# Patient Record
Sex: MALE | Race: WHITE | NOT HISPANIC OR LATINO | Employment: OTHER | URBAN - METROPOLITAN AREA
[De-identification: names, ages, dates, MRNs, and addresses within clinical notes are randomized per-mention and may not be internally consistent; named-entity substitution may affect disease eponyms.]

---

## 2017-11-08 ENCOUNTER — APPOINTMENT (OUTPATIENT)
Dept: ADMISSIONS | Facility: MEDICAL CENTER | Age: 62
DRG: 470 | End: 2017-11-08
Attending: ORTHOPAEDIC SURGERY
Payer: MEDICARE

## 2017-11-08 DIAGNOSIS — Z01.810 PRE-OPERATIVE CARDIOVASCULAR EXAMINATION: ICD-10-CM

## 2017-11-08 DIAGNOSIS — Z01.812 PRE-OPERATIVE LABORATORY EXAMINATION: ICD-10-CM

## 2017-11-08 LAB
APPEARANCE UR: CLEAR
BILIRUB UR QL STRIP.AUTO: ABNORMAL
COLOR UR: ABNORMAL
CULTURE IF INDICATED INDCX: NO UA CULTURE
EST. AVERAGE GLUCOSE BLD GHB EST-MCNC: 154 MG/DL
GLUCOSE UR STRIP.AUTO-MCNC: NEGATIVE MG/DL
HBA1C MFR BLD: 7 % (ref 0–5.6)
KETONES UR STRIP.AUTO-MCNC: ABNORMAL MG/DL
LEUKOCYTE ESTERASE UR QL STRIP.AUTO: NEGATIVE
MICRO URNS: ABNORMAL
NITRITE UR QL STRIP.AUTO: NEGATIVE
PH UR STRIP.AUTO: 6 [PH]
PROT UR QL STRIP: NEGATIVE MG/DL
RBC UR QL AUTO: NEGATIVE
SCCMEC + MECA PNL NOSE NAA+PROBE: NEGATIVE
SCCMEC + MECA PNL NOSE NAA+PROBE: NEGATIVE
SP GR UR STRIP.AUTO: 1.02
UROBILINOGEN UR STRIP.AUTO-MCNC: 0.2 MG/DL

## 2017-11-08 PROCEDURE — 36415 COLL VENOUS BLD VENIPUNCTURE: CPT

## 2017-11-08 PROCEDURE — 83036 HEMOGLOBIN GLYCOSYLATED A1C: CPT

## 2017-11-08 PROCEDURE — 87641 MR-STAPH DNA AMP PROBE: CPT

## 2017-11-08 PROCEDURE — 87640 STAPH A DNA AMP PROBE: CPT | Mod: 59

## 2017-11-08 PROCEDURE — 81003 URINALYSIS AUTO W/O SCOPE: CPT

## 2017-11-08 RX ORDER — GABAPENTIN 800 MG/1
800 TABLET ORAL 3 TIMES DAILY
COMMUNITY
End: 2023-01-25

## 2017-11-08 RX ORDER — IBUPROFEN 200 MG
200 TABLET ORAL EVERY 6 HOURS PRN
Status: ON HOLD | COMMUNITY
End: 2017-11-16

## 2017-11-08 RX ORDER — BUPRENORPHINE AND NALOXONE 8; 2 MG/1; MG/1
FILM, SOLUBLE BUCCAL; SUBLINGUAL 3 TIMES DAILY
Status: ON HOLD | COMMUNITY
End: 2017-11-16

## 2017-11-08 NOTE — OR NURSING
Pre admit appt: Pt instructed to continue regularly prescribed medications through day before surgery.Per anesthesia protocol pt instructed to take these medications with a sip of water the day of surgery- gabapentin, omeprazole, klonipin and verapamil. States stopping suboxone 3 days before surgery.

## 2017-11-08 NOTE — DISCHARGE PLANNING
DISCHARGE PLANNING NOTE - TOTAL JOINT     Procedure: Procedure(s):  HIP ARTHROPLASTY TOTAL  Procedure Date: 11/14/2017  Insurance:  Payor: MEDICARE / Plan: MEDICARE PART A & B / Product Type: *No Product type* /   Equipment currently available at home? cane and tub transfer bench  Steps into the home? 1  Steps within the home? 0  Toilet height? Standard  Type of shower? tub-shower  Who will be with you during your recovery? spouse  Is Outpatient Physical Therapy set up after surgery? No   Did you take the Total Joint Class and where? No     Plan:

## 2017-11-14 ENCOUNTER — HOSPITAL ENCOUNTER (INPATIENT)
Facility: MEDICAL CENTER | Age: 62
LOS: 3 days | DRG: 470 | End: 2017-11-17
Attending: ORTHOPAEDIC SURGERY | Admitting: ORTHOPAEDIC SURGERY
Payer: MEDICARE

## 2017-11-14 ENCOUNTER — APPOINTMENT (OUTPATIENT)
Dept: RADIOLOGY | Facility: MEDICAL CENTER | Age: 62
DRG: 470 | End: 2017-11-14
Attending: ORTHOPAEDIC SURGERY
Payer: MEDICARE

## 2017-11-14 DIAGNOSIS — M16.11 PRIMARY OSTEOARTHRITIS OF RIGHT HIP: ICD-10-CM

## 2017-11-14 DIAGNOSIS — Z96.641 STATUS POST TOTAL REPLACEMENT OF RIGHT HIP: Primary | ICD-10-CM

## 2017-11-14 LAB
GLUCOSE BLD-MCNC: 124 MG/DL (ref 65–99)
GLUCOSE BLD-MCNC: 202 MG/DL (ref 65–99)
GLUCOSE BLD-MCNC: 276 MG/DL (ref 65–99)

## 2017-11-14 PROCEDURE — 82962 GLUCOSE BLOOD TEST: CPT

## 2017-11-14 PROCEDURE — 160048 HCHG OR STATISTICAL LEVEL 1-5: Performed by: ORTHOPAEDIC SURGERY

## 2017-11-14 PROCEDURE — 160009 HCHG ANES TIME/MIN: Performed by: ORTHOPAEDIC SURGERY

## 2017-11-14 PROCEDURE — 160002 HCHG RECOVERY MINUTES (STAT): Performed by: ORTHOPAEDIC SURGERY

## 2017-11-14 PROCEDURE — 700112 HCHG RX REV CODE 229: Performed by: ORTHOPAEDIC SURGERY

## 2017-11-14 PROCEDURE — 501480 HCHG STOCKINETTE: Performed by: ORTHOPAEDIC SURGERY

## 2017-11-14 PROCEDURE — 72170 X-RAY EXAM OF PELVIS: CPT

## 2017-11-14 PROCEDURE — 160042 HCHG SURGERY MINUTES - EA ADDL 1 MIN LEVEL 5: Performed by: ORTHOPAEDIC SURGERY

## 2017-11-14 PROCEDURE — A9270 NON-COVERED ITEM OR SERVICE: HCPCS | Performed by: ORTHOPAEDIC SURGERY

## 2017-11-14 PROCEDURE — 700101 HCHG RX REV CODE 250

## 2017-11-14 PROCEDURE — 160031 HCHG SURGERY MINUTES - 1ST 30 MINS LEVEL 5: Performed by: ORTHOPAEDIC SURGERY

## 2017-11-14 PROCEDURE — 770006 HCHG ROOM/CARE - MED/SURG/GYN SEMI*

## 2017-11-14 PROCEDURE — 160035 HCHG PACU - 1ST 60 MINS PHASE I: Performed by: ORTHOPAEDIC SURGERY

## 2017-11-14 PROCEDURE — 700102 HCHG RX REV CODE 250 W/ 637 OVERRIDE(OP)

## 2017-11-14 PROCEDURE — A9270 NON-COVERED ITEM OR SERVICE: HCPCS

## 2017-11-14 PROCEDURE — 700102 HCHG RX REV CODE 250 W/ 637 OVERRIDE(OP): Performed by: ORTHOPAEDIC SURGERY

## 2017-11-14 PROCEDURE — 501838 HCHG SUTURE GENERAL: Performed by: ORTHOPAEDIC SURGERY

## 2017-11-14 PROCEDURE — 160036 HCHG PACU - EA ADDL 30 MINS PHASE I: Performed by: ORTHOPAEDIC SURGERY

## 2017-11-14 PROCEDURE — 502000 HCHG MISC OR IMPLANTS RC 0278: Performed by: ORTHOPAEDIC SURGERY

## 2017-11-14 PROCEDURE — 700111 HCHG RX REV CODE 636 W/ 250 OVERRIDE (IP): Performed by: ORTHOPAEDIC SURGERY

## 2017-11-14 PROCEDURE — 0SR904A REPLACEMENT OF RIGHT HIP JOINT WITH CERAMIC ON POLYETHYLENE SYNTHETIC SUBSTITUTE, UNCEMENTED, OPEN APPROACH: ICD-10-PCS | Performed by: ORTHOPAEDIC SURGERY

## 2017-11-14 PROCEDURE — 502578 HCHG PACK, TOTAL HIP: Performed by: ORTHOPAEDIC SURGERY

## 2017-11-14 PROCEDURE — 94760 N-INVAS EAR/PLS OXIMETRY 1: CPT

## 2017-11-14 PROCEDURE — 700111 HCHG RX REV CODE 636 W/ 250 OVERRIDE (IP)

## 2017-11-14 PROCEDURE — 500002 HCHG ADHESIVE, DERMABOND: Performed by: ORTHOPAEDIC SURGERY

## 2017-11-14 DEVICE — IMPLANTABLE DEVICE: Type: IMPLANTABLE DEVICE | Site: HIP | Status: FUNCTIONAL

## 2017-11-14 RX ORDER — DEXAMETHASONE SODIUM PHOSPHATE 4 MG/ML
4 INJECTION, SOLUTION INTRA-ARTICULAR; INTRALESIONAL; INTRAMUSCULAR; INTRAVENOUS; SOFT TISSUE
Status: DISCONTINUED | OUTPATIENT
Start: 2017-11-14 | End: 2017-11-17 | Stop reason: HOSPADM

## 2017-11-14 RX ORDER — ACETAMINOPHEN 500 MG
TABLET ORAL
Status: COMPLETED
Start: 2017-11-14 | End: 2017-11-14

## 2017-11-14 RX ORDER — POLYETHYLENE GLYCOL 3350 17 G/17G
1 POWDER, FOR SOLUTION ORAL 2 TIMES DAILY PRN
Status: DISCONTINUED | OUTPATIENT
Start: 2017-11-14 | End: 2017-11-17 | Stop reason: HOSPADM

## 2017-11-14 RX ORDER — GABAPENTIN 300 MG/1
CAPSULE ORAL
Status: COMPLETED
Start: 2017-11-14 | End: 2017-11-14

## 2017-11-14 RX ORDER — BISACODYL 10 MG
10 SUPPOSITORY, RECTAL RECTAL
Status: DISCONTINUED | OUTPATIENT
Start: 2017-11-14 | End: 2017-11-17 | Stop reason: HOSPADM

## 2017-11-14 RX ORDER — AMOXICILLIN 250 MG
1 CAPSULE ORAL NIGHTLY
Status: DISCONTINUED | OUTPATIENT
Start: 2017-11-14 | End: 2017-11-17 | Stop reason: HOSPADM

## 2017-11-14 RX ORDER — CELECOXIB 200 MG/1
200 CAPSULE ORAL 2 TIMES DAILY WITH MEALS
Status: DISCONTINUED | OUTPATIENT
Start: 2017-11-16 | End: 2017-11-17 | Stop reason: HOSPADM

## 2017-11-14 RX ORDER — ONDANSETRON 2 MG/ML
4 INJECTION INTRAMUSCULAR; INTRAVENOUS EVERY 4 HOURS PRN
Status: DISCONTINUED | OUTPATIENT
Start: 2017-11-14 | End: 2017-11-17 | Stop reason: HOSPADM

## 2017-11-14 RX ORDER — DIPHENHYDRAMINE HYDROCHLORIDE 50 MG/ML
25 INJECTION INTRAMUSCULAR; INTRAVENOUS EVERY 6 HOURS PRN
Status: DISCONTINUED | OUTPATIENT
Start: 2017-11-14 | End: 2017-11-17 | Stop reason: HOSPADM

## 2017-11-14 RX ORDER — OXYCODONE HYDROCHLORIDE 10 MG/1
20 TABLET ORAL
Status: DISCONTINUED | OUTPATIENT
Start: 2017-11-14 | End: 2017-11-17 | Stop reason: HOSPADM

## 2017-11-14 RX ORDER — CLONAZEPAM 0.5 MG/1
1 TABLET ORAL 3 TIMES DAILY
Status: DISCONTINUED | OUTPATIENT
Start: 2017-11-14 | End: 2017-11-17 | Stop reason: HOSPADM

## 2017-11-14 RX ORDER — OXYCODONE HCL 10 MG/1
TABLET, FILM COATED, EXTENDED RELEASE ORAL
Status: COMPLETED
Start: 2017-11-14 | End: 2017-11-14

## 2017-11-14 RX ORDER — MEPERIDINE HYDROCHLORIDE 25 MG/ML
INJECTION INTRAMUSCULAR; INTRAVENOUS; SUBCUTANEOUS
Status: COMPLETED
Start: 2017-11-14 | End: 2017-11-14

## 2017-11-14 RX ORDER — VERAPAMIL HYDROCHLORIDE 240 MG/1
240 TABLET, FILM COATED, EXTENDED RELEASE ORAL
Status: DISCONTINUED | OUTPATIENT
Start: 2017-11-15 | End: 2017-11-17 | Stop reason: HOSPADM

## 2017-11-14 RX ORDER — GABAPENTIN 400 MG/1
800 CAPSULE ORAL 3 TIMES DAILY
Status: DISCONTINUED | OUTPATIENT
Start: 2017-11-14 | End: 2017-11-17 | Stop reason: HOSPADM

## 2017-11-14 RX ORDER — AMOXICILLIN 250 MG
1 CAPSULE ORAL
Status: DISCONTINUED | OUTPATIENT
Start: 2017-11-14 | End: 2017-11-17 | Stop reason: HOSPADM

## 2017-11-14 RX ORDER — OMEPRAZOLE 20 MG/1
40 CAPSULE, DELAYED RELEASE ORAL
Status: DISCONTINUED | OUTPATIENT
Start: 2017-11-14 | End: 2017-11-17 | Stop reason: HOSPADM

## 2017-11-14 RX ORDER — HYDROCHLOROTHIAZIDE 25 MG/1
12.5 TABLET ORAL DAILY
Status: DISCONTINUED | OUTPATIENT
Start: 2017-11-15 | End: 2017-11-17 | Stop reason: HOSPADM

## 2017-11-14 RX ORDER — BUPIVACAINE HYDROCHLORIDE AND EPINEPHRINE 2.5; 5 MG/ML; UG/ML
INJECTION, SOLUTION EPIDURAL; INFILTRATION; INTRACAUDAL; PERINEURAL
Status: DISCONTINUED | OUTPATIENT
Start: 2017-11-14 | End: 2017-11-14 | Stop reason: HOSPADM

## 2017-11-14 RX ORDER — ENEMA 19; 7 G/133ML; G/133ML
1 ENEMA RECTAL
Status: DISCONTINUED | OUTPATIENT
Start: 2017-11-14 | End: 2017-11-17 | Stop reason: HOSPADM

## 2017-11-14 RX ORDER — SODIUM CHLORIDE, SODIUM LACTATE, POTASSIUM CHLORIDE, CALCIUM CHLORIDE 600; 310; 30; 20 MG/100ML; MG/100ML; MG/100ML; MG/100ML
INJECTION, SOLUTION INTRAVENOUS
Status: DISCONTINUED | OUTPATIENT
Start: 2017-11-14 | End: 2017-11-16

## 2017-11-14 RX ORDER — OXYCODONE HYDROCHLORIDE 10 MG/1
10 TABLET ORAL
Status: DISCONTINUED | OUTPATIENT
Start: 2017-11-14 | End: 2017-11-17 | Stop reason: HOSPADM

## 2017-11-14 RX ORDER — DOCUSATE SODIUM 100 MG/1
100 CAPSULE, LIQUID FILLED ORAL 2 TIMES DAILY
Status: DISCONTINUED | OUTPATIENT
Start: 2017-11-14 | End: 2017-11-17 | Stop reason: HOSPADM

## 2017-11-14 RX ORDER — DULOXETIN HYDROCHLORIDE 30 MG/1
90 CAPSULE, DELAYED RELEASE ORAL DAILY
Status: DISCONTINUED | OUTPATIENT
Start: 2017-11-15 | End: 2017-11-17 | Stop reason: HOSPADM

## 2017-11-14 RX ORDER — NIACIN 100 MG
100 TABLET ORAL DAILY
Status: DISCONTINUED | OUTPATIENT
Start: 2017-11-15 | End: 2017-11-17 | Stop reason: HOSPADM

## 2017-11-14 RX ORDER — ACETAMINOPHEN 500 MG
1000 TABLET ORAL EVERY 6 HOURS
Status: DISCONTINUED | OUTPATIENT
Start: 2017-11-15 | End: 2017-11-17 | Stop reason: HOSPADM

## 2017-11-14 RX ORDER — KETOROLAC TROMETHAMINE 30 MG/ML
30 INJECTION, SOLUTION INTRAMUSCULAR; INTRAVENOUS EVERY 6 HOURS
Status: DISPENSED | OUTPATIENT
Start: 2017-11-15 | End: 2017-11-15

## 2017-11-14 RX ORDER — CELECOXIB 200 MG/1
CAPSULE ORAL
Status: COMPLETED
Start: 2017-11-14 | End: 2017-11-14

## 2017-11-14 RX ORDER — LIDOCAINE HYDROCHLORIDE 10 MG/ML
INJECTION, SOLUTION INFILTRATION; PERINEURAL
Status: COMPLETED
Start: 2017-11-14 | End: 2017-11-14

## 2017-11-14 RX ORDER — OXYCODONE HCL 5 MG/5 ML
SOLUTION, ORAL ORAL
Status: COMPLETED
Start: 2017-11-14 | End: 2017-11-14

## 2017-11-14 RX ADMIN — OXYCODONE HYDROCHLORIDE 10 MG: 10 TABLET, FILM COATED, EXTENDED RELEASE ORAL at 16:45

## 2017-11-14 RX ADMIN — HYDROMORPHONE HYDROCHLORIDE 0.5 MG: 1 INJECTION, SOLUTION INTRAMUSCULAR; INTRAVENOUS; SUBCUTANEOUS at 20:14

## 2017-11-14 RX ADMIN — OXYCODONE HYDROCHLORIDE 10 MG: 5 SOLUTION ORAL at 20:00

## 2017-11-14 RX ADMIN — GABAPENTIN 300 MG: 300 CAPSULE ORAL at 16:45

## 2017-11-14 RX ADMIN — MEPERIDINE HYDROCHLORIDE 12.5 MG: 25 INJECTION INTRAMUSCULAR; INTRAVENOUS; SUBCUTANEOUS at 20:00

## 2017-11-14 RX ADMIN — FENTANYL CITRATE 50 MCG: 50 INJECTION, SOLUTION INTRAMUSCULAR; INTRAVENOUS at 19:41

## 2017-11-14 RX ADMIN — CLONAZEPAM 1 MG: 0.5 TABLET ORAL at 21:51

## 2017-11-14 RX ADMIN — CELECOXIB 400 MG: 200 CAPSULE ORAL at 16:45

## 2017-11-14 RX ADMIN — DOCUSATE SODIUM AND SENNOSIDES 1 TABLET: 8.6; 5 TABLET, FILM COATED ORAL at 21:52

## 2017-11-14 RX ADMIN — SODIUM CHLORIDE, SODIUM LACTATE, POTASSIUM CHLORIDE, CALCIUM CHLORIDE: 600; 310; 30; 20 INJECTION, SOLUTION INTRAVENOUS at 14:55

## 2017-11-14 RX ADMIN — OXYCODONE HYDROCHLORIDE 20 MG: 10 TABLET ORAL at 21:52

## 2017-11-14 RX ADMIN — ACETAMINOPHEN 1000 MG: 500 TABLET ORAL at 23:51

## 2017-11-14 RX ADMIN — HYDROMORPHONE HYDROCHLORIDE 0.5 MG: 1 INJECTION, SOLUTION INTRAMUSCULAR; INTRAVENOUS; SUBCUTANEOUS at 20:25

## 2017-11-14 RX ADMIN — ACETAMINOPHEN 1000 MG: 500 TABLET, COATED ORAL at 16:45

## 2017-11-14 RX ADMIN — KETOROLAC TROMETHAMINE 30 MG: 30 INJECTION, SOLUTION INTRAMUSCULAR at 23:51

## 2017-11-14 RX ADMIN — SITAGLIPTIN 50 MG: 50 TABLET, FILM COATED ORAL at 22:15

## 2017-11-14 RX ADMIN — FENTANYL CITRATE 50 MCG: 50 INJECTION, SOLUTION INTRAMUSCULAR; INTRAVENOUS at 19:47

## 2017-11-14 RX ADMIN — MEPERIDINE HYDROCHLORIDE 12.5 MG: 25 INJECTION INTRAMUSCULAR; INTRAVENOUS; SUBCUTANEOUS at 20:10

## 2017-11-14 RX ADMIN — FENTANYL CITRATE 50 MCG: 50 INJECTION, SOLUTION INTRAMUSCULAR; INTRAVENOUS at 19:36

## 2017-11-14 RX ADMIN — DOCUSATE SODIUM 100 MG: 100 CAPSULE, LIQUID FILLED ORAL at 21:53

## 2017-11-14 RX ADMIN — GABAPENTIN 800 MG: 400 CAPSULE ORAL at 21:52

## 2017-11-14 ASSESSMENT — LIFESTYLE VARIABLES
ON A TYPICAL DAY WHEN YOU DRINK ALCOHOL HOW MANY DRINKS DO YOU HAVE: 1
EVER_SMOKED: YES
ALCOHOL_USE: YES
TOTAL SCORE: 0
CONSUMPTION TOTAL: NEGATIVE
EVER FELT BAD OR GUILTY ABOUT YOUR DRINKING: NO
HAVE PEOPLE ANNOYED YOU BY CRITICIZING YOUR DRINKING: NO
EVER HAD A DRINK FIRST THING IN THE MORNING TO STEADY YOUR NERVES TO GET RID OF A HANGOVER: NO
AVERAGE NUMBER OF DAYS PER WEEK YOU HAVE A DRINK CONTAINING ALCOHOL: 0
EVER_SMOKED: YES
TOTAL SCORE: 0
HOW MANY TIMES IN THE PAST YEAR HAVE YOU HAD 5 OR MORE DRINKS IN A DAY: 0
TOTAL SCORE: 0
HAVE YOU EVER FELT YOU SHOULD CUT DOWN ON YOUR DRINKING: NO

## 2017-11-14 ASSESSMENT — PAIN SCALES - GENERAL
PAINLEVEL_OUTOF10: 8
PAINLEVEL_OUTOF10: ASSUMED PAIN PRESENT
PAINLEVEL_OUTOF10: 8
PAINLEVEL_OUTOF10: ASSUMED PAIN PRESENT
PAINLEVEL_OUTOF10: 8
PAINLEVEL_OUTOF10: 0
PAINLEVEL_OUTOF10: ASSUMED PAIN PRESENT

## 2017-11-14 NOTE — OR NURSING
Pt to pre-op. Name, birthday, allergies, NPO status, last void, medication rec, surgical site and procedure verified with patient.  Pt belongings collected and secured in locker.  Pt verbalizes understanding of pain scale, expected plan of care and course of stay.  IV established. Sequentials placed to lle. Family at bedside.

## 2017-11-15 PROBLEM — Z96.649 S/P TOTAL HIP ARTHROPLASTY: Status: ACTIVE | Noted: 2017-11-15

## 2017-11-15 LAB
ANION GAP SERPL CALC-SCNC: 8 MMOL/L (ref 0–11.9)
BUN SERPL-MCNC: 16 MG/DL (ref 8–22)
CALCIUM SERPL-MCNC: 8.8 MG/DL (ref 8.4–10.2)
CHLORIDE SERPL-SCNC: 105 MMOL/L (ref 96–112)
CO2 SERPL-SCNC: 25 MMOL/L (ref 20–33)
CREAT SERPL-MCNC: 0.7 MG/DL (ref 0.5–1.4)
ERYTHROCYTE [DISTWIDTH] IN BLOOD BY AUTOMATED COUNT: 48.4 FL (ref 35.9–50)
GFR SERPL CREATININE-BSD FRML MDRD: >60 ML/MIN/1.73 M 2
GLUCOSE BLD-MCNC: 152 MG/DL (ref 65–99)
GLUCOSE BLD-MCNC: 209 MG/DL (ref 65–99)
GLUCOSE BLD-MCNC: 212 MG/DL (ref 65–99)
GLUCOSE BLD-MCNC: 253 MG/DL (ref 65–99)
GLUCOSE SERPL-MCNC: 280 MG/DL (ref 65–99)
HCT VFR BLD AUTO: 32.1 % (ref 42–52)
HGB BLD-MCNC: 10.9 G/DL (ref 14–18)
MCH RBC QN AUTO: 29.6 PG (ref 27–33)
MCHC RBC AUTO-ENTMCNC: 34 G/DL (ref 33.7–35.3)
MCV RBC AUTO: 87.2 FL (ref 81.4–97.8)
PLATELET # BLD AUTO: 246 K/UL (ref 164–446)
PMV BLD AUTO: 9.4 FL (ref 9–12.9)
POTASSIUM SERPL-SCNC: 4.1 MMOL/L (ref 3.6–5.5)
RBC # BLD AUTO: 3.68 M/UL (ref 4.7–6.1)
SODIUM SERPL-SCNC: 138 MMOL/L (ref 135–145)
WBC # BLD AUTO: 10.5 K/UL (ref 4.8–10.8)

## 2017-11-15 PROCEDURE — 80048 BASIC METABOLIC PNL TOTAL CA: CPT

## 2017-11-15 PROCEDURE — 97161 PT EVAL LOW COMPLEX 20 MIN: CPT

## 2017-11-15 PROCEDURE — A9270 NON-COVERED ITEM OR SERVICE: HCPCS | Performed by: ORTHOPAEDIC SURGERY

## 2017-11-15 PROCEDURE — 700112 HCHG RX REV CODE 229: Performed by: ORTHOPAEDIC SURGERY

## 2017-11-15 PROCEDURE — G8978 MOBILITY CURRENT STATUS: HCPCS | Mod: CK

## 2017-11-15 PROCEDURE — 700105 HCHG RX REV CODE 258: Performed by: ORTHOPAEDIC SURGERY

## 2017-11-15 PROCEDURE — G8988 SELF CARE GOAL STATUS: HCPCS | Mod: CI

## 2017-11-15 PROCEDURE — 82962 GLUCOSE BLOOD TEST: CPT | Mod: 91

## 2017-11-15 PROCEDURE — 36415 COLL VENOUS BLD VENIPUNCTURE: CPT

## 2017-11-15 PROCEDURE — G8987 SELF CARE CURRENT STATUS: HCPCS | Mod: CJ

## 2017-11-15 PROCEDURE — 700102 HCHG RX REV CODE 250 W/ 637 OVERRIDE(OP): Performed by: ORTHOPAEDIC SURGERY

## 2017-11-15 PROCEDURE — 97110 THERAPEUTIC EXERCISES: CPT

## 2017-11-15 PROCEDURE — 97535 SELF CARE MNGMENT TRAINING: CPT

## 2017-11-15 PROCEDURE — 97165 OT EVAL LOW COMPLEX 30 MIN: CPT

## 2017-11-15 PROCEDURE — 85027 COMPLETE CBC AUTOMATED: CPT

## 2017-11-15 PROCEDURE — 700111 HCHG RX REV CODE 636 W/ 250 OVERRIDE (IP): Performed by: ORTHOPAEDIC SURGERY

## 2017-11-15 PROCEDURE — 770006 HCHG ROOM/CARE - MED/SURG/GYN SEMI*

## 2017-11-15 PROCEDURE — G8979 MOBILITY GOAL STATUS: HCPCS | Mod: CJ

## 2017-11-15 RX ORDER — DEXTROSE MONOHYDRATE 25 G/50ML
25 INJECTION, SOLUTION INTRAVENOUS
Status: DISCONTINUED | OUTPATIENT
Start: 2017-11-15 | End: 2017-11-17 | Stop reason: HOSPADM

## 2017-11-15 RX ADMIN — ACETAMINOPHEN 1000 MG: 500 TABLET ORAL at 05:10

## 2017-11-15 RX ADMIN — POLYETHYLENE GLYCOL 3350 1 PACKET: 17 POWDER, FOR SOLUTION ORAL at 08:44

## 2017-11-15 RX ADMIN — VITAMIN D, TAB 1000IU (100/BT) 1000 UNITS: 25 TAB at 08:46

## 2017-11-15 RX ADMIN — INSULIN HUMAN 2 UNITS: 100 INJECTION, SOLUTION PARENTERAL at 12:29

## 2017-11-15 RX ADMIN — METFORMIN HYDROCHLORIDE 1000 MG: 500 TABLET, FILM COATED ORAL at 08:44

## 2017-11-15 RX ADMIN — CLONAZEPAM 1 MG: 0.5 TABLET ORAL at 21:14

## 2017-11-15 RX ADMIN — GABAPENTIN 800 MG: 400 CAPSULE ORAL at 14:41

## 2017-11-15 RX ADMIN — DOCUSATE SODIUM 100 MG: 100 CAPSULE, LIQUID FILLED ORAL at 08:45

## 2017-11-15 RX ADMIN — ACETAMINOPHEN 1000 MG: 500 TABLET ORAL at 12:28

## 2017-11-15 RX ADMIN — POLYETHYLENE GLYCOL 3350 1 PACKET: 17 POWDER, FOR SOLUTION ORAL at 20:03

## 2017-11-15 RX ADMIN — OXYCODONE HYDROCHLORIDE 10 MG: 10 TABLET ORAL at 07:44

## 2017-11-15 RX ADMIN — DEXTROSE MONOHYDRATE 2 G: 50 INJECTION, SOLUTION INTRAVENOUS at 01:40

## 2017-11-15 RX ADMIN — ENOXAPARIN SODIUM 40 MG: 100 INJECTION SUBCUTANEOUS at 10:05

## 2017-11-15 RX ADMIN — GABAPENTIN 800 MG: 400 CAPSULE ORAL at 20:18

## 2017-11-15 RX ADMIN — DULOXETINE HYDROCHLORIDE 90 MG: 30 CAPSULE, DELAYED RELEASE ORAL at 08:51

## 2017-11-15 RX ADMIN — OXYCODONE HYDROCHLORIDE 10 MG: 10 TABLET ORAL at 11:16

## 2017-11-15 RX ADMIN — SITAGLIPTIN 50 MG: 50 TABLET, FILM COATED ORAL at 08:49

## 2017-11-15 RX ADMIN — CLONAZEPAM 1 MG: 0.5 TABLET ORAL at 08:45

## 2017-11-15 RX ADMIN — Medication 100 MG: at 08:46

## 2017-11-15 RX ADMIN — HYDROMORPHONE HYDROCHLORIDE 1 MG: 1 INJECTION, SOLUTION INTRAMUSCULAR; INTRAVENOUS; SUBCUTANEOUS at 18:39

## 2017-11-15 RX ADMIN — OXYCODONE HYDROCHLORIDE 20 MG: 10 TABLET ORAL at 14:03

## 2017-11-15 RX ADMIN — DOCUSATE SODIUM AND SENNOSIDES 1 TABLET: 8.6; 5 TABLET, FILM COATED ORAL at 20:03

## 2017-11-15 RX ADMIN — OXYCODONE HYDROCHLORIDE 20 MG: 10 TABLET ORAL at 16:59

## 2017-11-15 RX ADMIN — GABAPENTIN 800 MG: 400 CAPSULE ORAL at 08:46

## 2017-11-15 RX ADMIN — HYDROMORPHONE HYDROCHLORIDE 1 MG: 1 INJECTION, SOLUTION INTRAMUSCULAR; INTRAVENOUS; SUBCUTANEOUS at 14:44

## 2017-11-15 RX ADMIN — CLONAZEPAM 1 MG: 0.5 TABLET ORAL at 13:17

## 2017-11-15 RX ADMIN — KETOROLAC TROMETHAMINE 30 MG: 30 INJECTION, SOLUTION INTRAMUSCULAR at 05:11

## 2017-11-15 RX ADMIN — METFORMIN HYDROCHLORIDE 1000 MG: 500 TABLET, FILM COATED ORAL at 17:00

## 2017-11-15 RX ADMIN — OXYCODONE HYDROCHLORIDE 10 MG: 10 TABLET ORAL at 04:24

## 2017-11-15 RX ADMIN — HYDROMORPHONE HYDROCHLORIDE 1 MG: 1 INJECTION, SOLUTION INTRAMUSCULAR; INTRAVENOUS; SUBCUTANEOUS at 22:05

## 2017-11-15 RX ADMIN — OXYCODONE HYDROCHLORIDE 20 MG: 10 TABLET ORAL at 20:22

## 2017-11-15 RX ADMIN — DEXTROSE MONOHYDRATE 2 G: 50 INJECTION, SOLUTION INTRAVENOUS at 10:05

## 2017-11-15 RX ADMIN — DOCUSATE SODIUM 100 MG: 100 CAPSULE, LIQUID FILLED ORAL at 20:03

## 2017-11-15 RX ADMIN — OXYCODONE HYDROCHLORIDE 20 MG: 10 TABLET ORAL at 00:52

## 2017-11-15 RX ADMIN — SITAGLIPTIN 50 MG: 50 TABLET, FILM COATED ORAL at 20:17

## 2017-11-15 ASSESSMENT — GAIT ASSESSMENTS
DEVIATION: DECREASED HEEL STRIKE;DECREASED TOE OFF;STEP TO;ANTALGIC
ASSISTIVE DEVICE: FRONT WHEEL WALKER
GAIT LEVEL OF ASSIST: CONTACT GUARD ASSIST
DISTANCE (FEET): 80

## 2017-11-15 ASSESSMENT — PAIN SCALES - GENERAL
PAINLEVEL_OUTOF10: 6
PAINLEVEL_OUTOF10: ASSUMED PAIN PRESENT
PAINLEVEL_OUTOF10: 8
PAINLEVEL_OUTOF10: 7
PAINLEVEL_OUTOF10: 5
PAINLEVEL_OUTOF10: 8
PAINLEVEL_OUTOF10: 8
PAINLEVEL_OUTOF10: 7
PAINLEVEL_OUTOF10: 6
PAINLEVEL_OUTOF10: 5
PAINLEVEL_OUTOF10: 7
PAINLEVEL_OUTOF10: 6
PAINLEVEL_OUTOF10: 0

## 2017-11-15 ASSESSMENT — ACTIVITIES OF DAILY LIVING (ADL): TOILETING: INDEPENDENT

## 2017-11-15 NOTE — CARE PLAN
Problem: Pain Management  Goal: Pain level will decrease to patient's comfort goal  Outcome: PROGRESSING AS EXPECTED  Pt was initially c/o severe pain after surgery; medicated with 10-20 mg Oxycodone, Tylenol, and Toradol per MAR and polar ice in place with improvement in comfort status. Pt is currently calm, non-labored respirations.      Problem: Respiratory:  Goal: Respiratory status will improve    Intervention: Assess and monitor pulmonary status  Pt on 4L via oxymask upon initial arrival to unit. Encouraging frequent use of incentive spirometer (pt reaching volume of 4000 mL) and weaning O2 as tolerated with continuous pulse ox. Pt currently saturating at 90% on room air.

## 2017-11-15 NOTE — THERAPY
"Physical Therapy Evaluation completed.   Bed Mobility:  Supine to Sit:  (NT, pt sitting up in chair)  Transfers: Sit to Stand: Contact Guard Assist  Gait: Level Of Assist: Contact Guard Assist with Front-Wheel Walker       Plan of Care: Will benefit from Physical Therapy 7 times per week  Discharge Recommendations: Equipment: Raised Toilet Seat. Post-acute therapy Discharge to a transitional care facility for continued skilled therapy services.    See \"Rehab Therapy-Acute\" Patient Summary Report for complete documentation.     "

## 2017-11-15 NOTE — PROGRESS NOTES
2 RN skin assessment done; skin WDL aside from surgical incision to R hip that is clean, dry, and in tact.

## 2017-11-15 NOTE — DISCHARGE PLANNING
Care Transition Team Assessment      SW met with this patient to discuss discharge planning.  Patient stated that he would like to go back to [place] as he has been there before and it is close to his home.  This patient stated that he lives with his wife however worries that she would not be physically able to assist him at this time.  SS to follow up as needed.     Information Source  Orientation : Oriented x 4  Information Given By: Patient  Informant's Name: Michael  Who is responsible for making decisions for patient? : Patient    Readmission Evaluation  Is this a readmission?: No    Elopement Risk  Legal Hold: No  Ambulatory or Self Mobile in Wheelchair: Yes  Disoriented: No  Psychiatric Symptoms: None  History of Wandering: No  Elopement this Admit: No  Vocalizing Wanting to Leave: No  Displays Behaviors, Body Language Wanting to Leave: No-Not at Risk for Elopement  Elopement Risk: Not at Risk for Elopement    Interdisciplinary Discharge Planning  Primary Care Physician: [place]  Lives with - Patient's Self Care Capacity: Spouse  Patient or legal guardian wants to designate a caregiver (see row info): No  Support Systems: Friends / Neighbors, Family Member(s)  Housing / Facility: [address]  Do You Take your Prescribed Medications Regularly: Yes  Able to Return to Previous ADL's: Yes  Mobility Issues: Yes  Prior Services: None  Patient Expects to be Discharged to:: home  Assistance Needed: Unknown at this Time  Durable Medical Equipment: Walker    Discharge Preparedness  What is your plan after discharge?: Skilled nursing facility (Patient is requesting to go to [place] in [place])  What are your discharge supports?: Spouse  Prior Functional Level: Independent with Activities of Daily Living    Functional Assesment  Prior Functional Level: Independent with Activities of Daily Living    Finances  Financial Barriers to Discharge: No  Prescription Coverage: Yes    Vision / Hearing Impairment  Vision  Impairment : No  Hearing Impairment : No    Values / Beliefs / Concerns  Spiritual Requests During Hospitalization: No  Special Hospitalization Concerns: No    Advance Directive  Advance Directive?: None    Domestic Abuse  Have you ever been the victim of abuse or violence?: No  Physical Abuse or Sexual Abuse: No  Verbal Abuse or Emotional Abuse: No    Psychological Assessment  History of Substance Abuse: Prescription opioids  Date Last Used - Prescription Opioids: was in rehab when he fell         Anticipated Discharge Information  Anticipated discharge disposition: SNF

## 2017-11-15 NOTE — PROGRESS NOTES
"Pt assessed. No deformity noted to Right leg. Able to bear weight with FWW. Good CMS and 2+ pulse to right foot. Pt upset saying his pain is not managed. Wants Oxycontin ordered. Says \"I want to go home so I can sleep.\" Told him that a sleeping aid could possibly be ordered. Will call office and discuss.   "

## 2017-11-15 NOTE — OR NURSING
1932: Report from ANNETTE Vaughn.  Patient is laying in bed, chewing on blanket, moaning, she gave him 50 mcg fentanyl.  Dressing to right hip clean, dry, and intact, radial pulse 3+, toes pink and warm, cap refill <3 seconds.  1945: X-ray at bedside, Patient still moaning.    1957: Patient still moaning in pain after 200 mcg of fent  2000: Patient shivering, demerol given. Encouraging patient to take deep, slow breaths.  2008: Patient still shivering slightly, second dose of demerol given.Dr. Husain paged.  2013: Dr Husain called back, he verbally ordered .5 mg dilaudid to be given q 5 min up to 2 mg over the telephone. .5 mg dose administered to patient.  2020: Patient reports he is still feeling pain and it is not yet at a tolerable level, will give one more .5 mg dose of dilaudid.  Vitals stable, O2 remains 100% on 3 lpm.  2035: Patient resting with eyes closed, no signs of pain or discomfort.  While sleeping patient desats to 80s.  Patient's O2 levels increase to above 90% when awoken, oxymask placed and O2 turned to 4 lmp, will monitor O2 sats at rest before sending to floor.  2050: Patient saturating at 100% on 4lmp oxy mask, patient moans when he moves and states he is in pain, patient told he cannot have any more pain medication at this tome as we are monitoring his saturations, he verbally understands, continuing to monitor.  2100: Patient sleeping with oxymask in place, saturations remain above 90% while asleep.    2120: Patient remaining above 90%, 100% majority of the time.  Patient awake and alert, talking to Myself and ANNETTE Parsons.  Patient meets criteria for transfer to the floor.  ANNETTE Soto updated on patient's status.  2122: Patient awake and alert, O2 at 100%, transferring to room.

## 2017-11-15 NOTE — OR NURSING
1911- Pt to pacu via bed with side rails up.  VSS. OPA in place, respirations spontaneous and unlabored.  R hip dressing cdi.  2+ R pedal pulse.  1926-VSS.  Pt c/o pain to R hip, see mar for meds given.  1932- FSBG 202.  IV fluid changed to NS. Report to Sweta BRYANT.

## 2017-11-15 NOTE — DISCHARGE PLANNING
ADAN called Dr. Chaudhari office and left a message regarding Rehab declining the patient and suggested possible SNF.  ADAN will await a call back or to receive new orders.

## 2017-11-15 NOTE — PROGRESS NOTES
62yoM s/p right RACHEL 11/14.    S: Doing well, ambulated to bathroom this am.  Says it felt better than he expected.  Upright in chair eating breakfast.    O:    Vitals:    11/14/17 2320 11/15/17 0400 11/15/17 0521 11/15/17 0731   BP: 120/67 101/60  102/65   Pulse: 72 67 60 72   Resp:  16 15 16   Temp:  36.8 °C (98.3 °F)  36.2 °C (97.2 °F)   SpO2: 98% 98% 90% 98%   Weight:       Height:         Exam:  General-NAD, alert and oriented, following commands  RLE-hip dressing c/d/i, +EHL/FHL/TA/GS motor, baseline sensory deficit in feet (neuropathy), palp dp pulse    Hct: 32.1  Glucose: 202-276    A: 62yoM s/p right RACHEL 11/14.    Plan:  --WBAT RLE  --ancef x 2 doses postop  --PT/OT for mobilization  --posterior hip precautions  --start lovenox this am, continue SCDs  --patient feel better looking into postop rehab rather than going home with home health.  Will look into options.  --continue home glycemic meds, start SSI

## 2017-11-15 NOTE — DISCHARGE PLANNING
PMR referral for IRF level of care.     Elective hip medicare age 62. Medicare does not cover IRF level of care for elective single joint age 84 or less. Anticipate skilled nursing for post acute services when medically cleared. No physiatry consult ordered per protocol.  aware. [id number]

## 2017-11-15 NOTE — OP REPORT
DATE OF SERVICE:  11/14/2017    PREOPERATIVE DIAGNOSIS:  Right hip end-stage osteoarthritis.    POSTOPERATIVE DIAGNOSIS:  Right hip end-stage osteoarthritis.    PROCEDURE PERFORMED:  Right total hip arthroplasty.    SURGEON:  Shawn Chaudhari MD    ANESTHESIOLOGIST:  Santiago Richmond MD    ANESTHESIA:  General.    ASSISTANT:  Valdez Mariscal MD    ESTIMATED BLOOD LOSS:  300 mL.    IMPLANTS:  DePuy pressfit right total hip arthroplasty with the following   components:  1.  A size 64 Bryan 3-hole acetabular shell with one 6.5 mm screw.  2.  A 64x36 mm polyethylene neutral acetabular liner.  3.  A 36x12 mm Biolox delta ceramic femoral head.  4.  Size 8 Caldwell standard offset femoral stem.    INDICATION FOR PROCEDURE:  The patient is a 62-year-old male.  He has   end-stage osteoarthritis of right hip.  He was evaluated preoperatively and   felt to be an appropriate surgical candidate by his primary care physician.  I   discussed with him treatment options and recommended right total hip   arthroplasty as he has failed conservative management.  He signed informed   consent preoperatively and wished to proceed as outlined above.    DESCRIPTION OF PROCEDURE:  Patient was met in the preoperative holding area.    Surgical site was signed and his consent was confirmed for accuracy.  He was   taken back to the operating room and general anesthesia was induced.  He   received vancomycin and Ancef preoperatively.  He was positioned in the left   lateral decubitus position with Stulberg positioners.  The right lower   extremity was then prepped and draped in the usual sterile fashion.  Formal   timeout was performed to confirm patient's correct name, correct surgical   site, correct procedure, and correct laterality.  A posterior approach to the   hip was then performed with the scalpel down through skin.  Dissection was   carried down through subcutaneous tissue to the IT band, which was split   longitudinally.  The short  external rotators including the piriformis were   identified.  A plane was developed in between gluteus minimus and piriformis   and the external rotators and the posterior capsule were released off the   proximal femur and a full-thickness sleeve.  The hip was dislocated.  There   was end-stage degenerative changes with significant osteophyte formation and   malformation of the head.  Retractors were applied, and oscillating saw was   used to perform a femoral neck cut approximately 12 mm above the level of the   lesser trochanter.  The head was excised and measured and anterior and   inferior acetabular retractors were applied.  I tagged the piriformis and   posterior capsule with 0 Vicryl.  I cleared the soft tissue from the cotyloid   fossa and medialized down to about 3-4 mm pad of the cotyloid baseplate.  I   then expanded reaming up to ultimately a 64-mm reamer, and it was down to   bleeding subchondral bone and selected a 64-mm 3-hole acetabular cup.  I   thoroughly cleared the acetabulum of soft tissue, and with appropriate   retractors in place, impacted the 64-mm acetabular shell in approximately 5   degrees of increased anteversion compared to its native anteversion and what I   left felt is appropriate abduction based on the level of the transverse   acetabular ligament inferiorly.  I then placed one superior 40-mm screw, which   had excellent purchase on bone.  I then irrigated out the acetabular shell   and inserted the neutral acetabular liner and then started preparing the femur   with appropriate soft tissue retraction in place using box osteotome followed   by canal finding reamer and sequentially broached up to size 8, which had   good rotational stability and fill of the femur.  I then trialed with an 8.5   mm neck length, and he had good stability parameters.  I then thoroughly   irrigated the femur canal and inserted the size 8 standard offset femoral stem   and impacted into place until it  was well seated, confirmed there was no   iatrogenic fracture at the medial calcar.  I then trialed again and ultimately   selected a 12-mm ceramic Biolox delta femoral head, and he had good stability   parameters and approximately 40 degrees of combined anteversion and   thoroughly cleaned and dried the Redding taper and gently impacted into place   and reduced the hip.  I then thoroughly irrigated the wound and soaked the   wound in dilute Betadine solution for 3 minutes.  I then repaired the   posterior capsule and the short external rotators through bone tunnels within   the greater trochanter with #5 Ethibond.  I repaired the IT band with size 2   Stratafix suture.  The subcutaneous layers with 0 Vicryl and 2-0 Vicryl and   the skin edges with staples.  The wound was thoroughly cleansed and dried and   a sterile silver impregnated Aquacel dressing was applied.  He was then awoken   from anesthesia and transferred on the [facility] and taken to postanesthesia   care unit in stable condition.    PLAN:  1.  Patient will be admitted postop.  2.  He can be weightbearing as tolerated to the right lower extremity but   should maintain posterior hip precautions at all times.  3.  He should work with physical and occupational therapies as soon as   possible for mobilization.  4.  He will need Ancef for 2 doses postop for infection prophylaxis.  5.  I will resume his home medications and work on pain control   postoperatively.  6.  He will start Lovenox in the morning and continue SCDs for DVT prophylaxis.    Dr. Mariscal was present for the duration of the procedure and assisted with   patient positioning, retracting hip reduction, and dislocation.  He was   crucial assistant in successfully achieving this operation.       ____________________________________     MD CHAN Robles / EVGENY    DD:  11/14/2017 19:23:22  DT:  11/14/2017 20:30:27    D#:  [id number]  Job#:  [id number]

## 2017-11-15 NOTE — THERAPY
"Occupational Therapy Evaluation completed.   Functional Status: A&Ox4. Recalls 1/3 posterior hip prec's; reviewed and demo'd. WBAT RLE. Pleasant and cooperative. UIC prior/post session. STS with SBA, v/c's. Toileting in BR with SBA/CGA. ADL transfers with SBA/CGA. Initiated AE training. Dons LE garment using reacher with CGA.  v/c's.          Plan of Care: Will benefit from Occupational Therapy 3 times per week  Discharge Recommendations:  Equipment: No Equipment Needed at this time due to pt going to snf. Rec RTS with arms, reacher, sock-aid for home. Discharge to a transitional care facility for continued skilled therapy services.  Lives with wife. Hx of hip dislocation (with L RACHEL ~10 years ago).   See \"Rehab Therapy-Acute\" Patient Summary Report for complete documentation.    "

## 2017-11-15 NOTE — PROGRESS NOTES
Total Joint Replacement Program Rounding     Inpatient bedside rounding completed to address quality of care provided by total joint replacement program IDT and overall patient experience at [facility].    Performance Measures addressed: Hx of previous RACHEL 10 yrs ago. Pt reports he had labs at [facility] as well as an outside facility ([facility]). [facility] results review shows negative nasal swab results.  Pt states he did complete mupirocin for 5 days preoperatively. H&P documents order for mupirocin 11/08.   Any specific concerns: RN notified regarding pt requesting stool softener/laxative.   Additional notes: DC plan home w/ spouse vs SNF - RN aware.

## 2017-11-15 NOTE — PROGRESS NOTES
Called to pt.room by SW as pt.was very upset c/o pain and pain medication was not due for another 20 min.Also complained that he twisted his hip and in more pain-sitting at the side of the bed at this time.Medicated early from scheduled time,called office and made aware of issues ordered to evaluate if he can walk and bear weight then call back if unable.Pt.throwing bedside phone on the floor frustrated post talking to MD's office.Evaluated if he can bear weight,ambulated to the to the door with his walker was able to bear weight.

## 2017-11-15 NOTE — PROGRESS NOTES
Pt arrives to unit from PACU on 4L via oxymask. Pt A&Ox4, currently saturating at 100% while awake, denies any shortness of breath, calm and cooperative. Pt rating his pain as 8/10 in severity, medicated per MAR. Surgical dressing is clean dry and in tact. Pt denies any numbness or tingling and has 2+ distal pulses. Polar ice set up to R hip. History, allergies, and med rec reviewed and admission profile completed. Pt oriented to unit and plan of care discussed. Safety precautions in place, will continue to monitor.

## 2017-11-15 NOTE — PROGRESS NOTES
Pt ambulated to bathroom and back using FWW x2 asst, slightly unsteady but otherwise tolerated well. Voided 500 mL clear yellow urine.

## 2017-11-15 NOTE — OR SURGEON
Immediate Post OP Note    PreOp Diagnosis: Right hip end stage osteoarthritis    PostOp Diagnosis: same    Procedure(s):  HIP ARTHROPLASTY TOTAL - Wound Class: Clean    Surgeon(s):  Shawn Chaudhari M.D.  Valdez Mariscal M.D.    Anesthesiologist/Type of Anesthesia:  Anesthesiologist: Santiago Richmond M.D./General    Surgical Staff:  Circulator: Yojana Farias R.N.; Britni Abdullahi R.N.  Limb Gutierrez: Roro Porter  Relief Circulator: Cindi Veloz R.N.  Scrub Person: Leanna Garibay    Specimens: none    Estimated Blood Loss: 300cc    Findings: see dictation    Complications: none known    PLAN:  --admit postop  --WBAT RLE  --ancef x 2 doses postop  --PT/OT for mobilization  --posterior hip precautions  --start lovenox in am, continue SCDs          11/14/2017 7:08 PM Shawn Chaudhari

## 2017-11-16 LAB
GLUCOSE BLD-MCNC: 134 MG/DL (ref 65–99)
GLUCOSE BLD-MCNC: 148 MG/DL (ref 65–99)
GLUCOSE BLD-MCNC: 178 MG/DL (ref 65–99)
HCT VFR BLD AUTO: 25.9 % (ref 42–52)
HGB BLD-MCNC: 8.7 G/DL (ref 14–18)

## 2017-11-16 PROCEDURE — 97535 SELF CARE MNGMENT TRAINING: CPT

## 2017-11-16 PROCEDURE — 85018 HEMOGLOBIN: CPT

## 2017-11-16 PROCEDURE — 97116 GAIT TRAINING THERAPY: CPT

## 2017-11-16 PROCEDURE — A9270 NON-COVERED ITEM OR SERVICE: HCPCS | Performed by: ORTHOPAEDIC SURGERY

## 2017-11-16 PROCEDURE — 97110 THERAPEUTIC EXERCISES: CPT

## 2017-11-16 PROCEDURE — 700112 HCHG RX REV CODE 229: Performed by: ORTHOPAEDIC SURGERY

## 2017-11-16 PROCEDURE — 770006 HCHG ROOM/CARE - MED/SURG/GYN SEMI*

## 2017-11-16 PROCEDURE — 85014 HEMATOCRIT: CPT

## 2017-11-16 PROCEDURE — 36415 COLL VENOUS BLD VENIPUNCTURE: CPT

## 2017-11-16 PROCEDURE — 82962 GLUCOSE BLOOD TEST: CPT

## 2017-11-16 PROCEDURE — 700102 HCHG RX REV CODE 250 W/ 637 OVERRIDE(OP): Performed by: ORTHOPAEDIC SURGERY

## 2017-11-16 PROCEDURE — 700111 HCHG RX REV CODE 636 W/ 250 OVERRIDE (IP): Performed by: ORTHOPAEDIC SURGERY

## 2017-11-16 RX ORDER — OXYCODONE HCL 20 MG/1
20 TABLET, FILM COATED, EXTENDED RELEASE ORAL EVERY 12 HOURS
Status: DISCONTINUED | OUTPATIENT
Start: 2017-11-16 | End: 2017-11-17 | Stop reason: HOSPADM

## 2017-11-16 RX ORDER — OXYCODONE HCL 20 MG/1
20 TABLET, FILM COATED, EXTENDED RELEASE ORAL EVERY 12 HOURS
Start: 2017-11-16 | End: 2017-11-17

## 2017-11-16 RX ORDER — PSEUDOEPHEDRINE HCL 30 MG
100 TABLET ORAL 2 TIMES DAILY
Start: 2017-11-16 | End: 2023-01-25

## 2017-11-16 RX ORDER — BISACODYL 10 MG
10 SUPPOSITORY, RECTAL RECTAL
Refills: 0
Start: 2017-11-16 | End: 2020-08-24

## 2017-11-16 RX ORDER — OXYCODONE HYDROCHLORIDE 10 MG/1
10 TABLET ORAL
Start: 2017-11-16 | End: 2017-11-17

## 2017-11-16 RX ORDER — DEXTROSE MONOHYDRATE 25 G/50ML
25 INJECTION, SOLUTION INTRAVENOUS PRN
Refills: 0
Start: 2017-11-16 | End: 2023-01-25

## 2017-11-16 RX ORDER — CELECOXIB 200 MG/1
200 CAPSULE ORAL 2 TIMES DAILY WITH MEALS
Start: 2017-11-16 | End: 2017-11-19

## 2017-11-16 RX ORDER — ACETAMINOPHEN 500 MG
500-1000 TABLET ORAL EVERY 6 HOURS
Qty: 30 TAB | Refills: 0
Start: 2017-11-16

## 2017-11-16 RX ORDER — OXYCODONE HYDROCHLORIDE 20 MG/1
20 TABLET ORAL
Status: ON HOLD
Start: 2017-11-16 | End: 2019-04-10

## 2017-11-16 RX ADMIN — DOCUSATE SODIUM 100 MG: 100 CAPSULE, LIQUID FILLED ORAL at 21:01

## 2017-11-16 RX ADMIN — DULOXETINE HYDROCHLORIDE 90 MG: 30 CAPSULE, DELAYED RELEASE ORAL at 08:01

## 2017-11-16 RX ADMIN — CLONAZEPAM 1 MG: 0.5 TABLET ORAL at 21:01

## 2017-11-16 RX ADMIN — Medication 100 MG: at 08:01

## 2017-11-16 RX ADMIN — SITAGLIPTIN 50 MG: 50 TABLET, FILM COATED ORAL at 21:04

## 2017-11-16 RX ADMIN — SITAGLIPTIN 50 MG: 50 TABLET, FILM COATED ORAL at 08:02

## 2017-11-16 RX ADMIN — MAGNESIUM HYDROXIDE 30 ML: 400 SUSPENSION ORAL at 09:41

## 2017-11-16 RX ADMIN — VITAMIN D, TAB 1000IU (100/BT) 1000 UNITS: 25 TAB at 08:00

## 2017-11-16 RX ADMIN — OXYCODONE HYDROCHLORIDE 20 MG: 10 TABLET ORAL at 11:59

## 2017-11-16 RX ADMIN — OXYCODONE HYDROCHLORIDE 20 MG: 10 TABLET ORAL at 08:00

## 2017-11-16 RX ADMIN — GABAPENTIN 800 MG: 400 CAPSULE ORAL at 21:02

## 2017-11-16 RX ADMIN — OXYCODONE HYDROCHLORIDE 20 MG: 10 TABLET ORAL at 18:38

## 2017-11-16 RX ADMIN — OXYCODONE HYDROCHLORIDE 20 MG: 20 TABLET, FILM COATED, EXTENDED RELEASE ORAL at 21:03

## 2017-11-16 RX ADMIN — OXYCODONE HYDROCHLORIDE 20 MG: 10 TABLET ORAL at 15:14

## 2017-11-16 RX ADMIN — ENOXAPARIN SODIUM 40 MG: 100 INJECTION SUBCUTANEOUS at 09:41

## 2017-11-16 RX ADMIN — METFORMIN HYDROCHLORIDE 1000 MG: 500 TABLET, FILM COATED ORAL at 18:00

## 2017-11-16 RX ADMIN — CELECOXIB 200 MG: 200 CAPSULE ORAL at 08:01

## 2017-11-16 RX ADMIN — ACETAMINOPHEN 1000 MG: 500 TABLET ORAL at 18:00

## 2017-11-16 RX ADMIN — OXYCODONE HYDROCHLORIDE 20 MG: 10 TABLET ORAL at 21:10

## 2017-11-16 RX ADMIN — OXYCODONE HYDROCHLORIDE 20 MG: 10 TABLET ORAL at 00:16

## 2017-11-16 RX ADMIN — DOCUSATE SODIUM AND SENNOSIDES 1 TABLET: 8.6; 5 TABLET, FILM COATED ORAL at 21:02

## 2017-11-16 RX ADMIN — CLONAZEPAM 1 MG: 0.5 TABLET ORAL at 15:14

## 2017-11-16 RX ADMIN — OXYCODONE HYDROCHLORIDE 20 MG: 20 TABLET, FILM COATED, EXTENDED RELEASE ORAL at 10:40

## 2017-11-16 RX ADMIN — OXYCODONE HYDROCHLORIDE 20 MG: 10 TABLET ORAL at 04:26

## 2017-11-16 RX ADMIN — INSULIN HUMAN 1 UNITS: 100 INJECTION, SOLUTION PARENTERAL at 21:12

## 2017-11-16 RX ADMIN — CELECOXIB 200 MG: 200 CAPSULE ORAL at 18:01

## 2017-11-16 RX ADMIN — GABAPENTIN 800 MG: 400 CAPSULE ORAL at 08:00

## 2017-11-16 RX ADMIN — DOCUSATE SODIUM 100 MG: 100 CAPSULE, LIQUID FILLED ORAL at 08:00

## 2017-11-16 RX ADMIN — METFORMIN HYDROCHLORIDE 1000 MG: 500 TABLET, FILM COATED ORAL at 08:01

## 2017-11-16 RX ADMIN — GABAPENTIN 800 MG: 400 CAPSULE ORAL at 15:14

## 2017-11-16 RX ADMIN — CLONAZEPAM 1 MG: 0.5 TABLET ORAL at 08:01

## 2017-11-16 ASSESSMENT — PAIN SCALES - GENERAL
PAINLEVEL_OUTOF10: 8
PAINLEVEL_OUTOF10: ASSUMED PAIN PRESENT
PAINLEVEL_OUTOF10: 7
PAINLEVEL_OUTOF10: 8
PAINLEVEL_OUTOF10: ASSUMED PAIN PRESENT
PAINLEVEL_OUTOF10: 9

## 2017-11-16 ASSESSMENT — GAIT ASSESSMENTS
DISTANCE (FEET): 85
GAIT LEVEL OF ASSIST: CONTACT GUARD ASSIST
DEVIATION: STEP TO;BRADYKINETIC;DECREASED HEEL STRIKE;DECREASED TOE OFF
ASSISTIVE DEVICE: FRONT WHEEL WALKER

## 2017-11-16 NOTE — PROGRESS NOTES
Received Bedside report. Assumed care at 0715. This pt is AOx4, ambulatory with one assist and FWW, no N/V this morning, voiding, c/o 8/10 R pain. Patient and RN discussed plan of care including pain control and dc planning: questions answered. Labs noted, assessment complete, patient tolerating diabetic diet. Call light in place, fall precautions in place, patient educated on importance of calling for assistance. No additional needs at this time. VSS

## 2017-11-16 NOTE — PROGRESS NOTES
62yoM s/p right RACHEL 11/14.  Hx of chronic pain and opioid dependence.    S: Sitting upright in chair, appears comfortable.    O:    Vitals:    11/16/17 0249 11/16/17 0400 11/16/17 0754 11/16/17 0945   BP: (!) 98/55 102/55 116/62 (!) 98/53   Pulse: 67 66 72    Resp: 16 18 18    Temp:   36.6 °C (97.8 °F)    SpO2: 100% 100% 98%    Weight:       Height:         Exam:  General-NAD, alert and oriented, following commands  RLE-hip dressing c/d/i, +EHL/FHL/TA/GS motor, baseline sensory deficit in feet (neuropathy), palp dp pulse    Hct: 25.9  Glucose: 152-212    A: 62yoM s/p right RACHEL 11/14.  Hx of chronic pain and opioid dependence.    Plan:  --WBAT RLE  --PT/OT for mobilization  --posterior hip precautions  --continue lovenox while inpatient  --pending SNF placement, okay for transfer when accepted  --continue home glycemic meds, continue SSI  --will adjust pain regimen, we discussed at length that pain management will be challenging (as we discussed at length preoperatively) given his hx of chronic pain and opioid dependence.    --fu as scheduled 2 weeks postop

## 2017-11-16 NOTE — PROGRESS NOTES
Pts request for Dilaudid pain med declined at this time due to low bp.Pt made aware of this low bp result 98/55, pt agree.

## 2017-11-16 NOTE — DISCHARGE SUMMARY
DATE OF ADMISSION:  11/14/2017    DATE OF DISCHARGE:  11/16/2016    ADMISSION DIAGNOSES:  1.  Right hip, end-stage osteoarthritis.  2.  History of chronic pain and opioid dependence.    DISCHARGE DIAGNOSES:  1.  Right hip, end-stage osteoarthritis.  2.  Status post right total hip arthroplasty.  3.  History of chronic pain and opioid dependence.    BRIEF HOSPITAL COURSE:  The patient is a 62-year-old male.  He was   pre-admitted on 11/14/2017 for right total hip arthroplasty.  Postoperatively,   he was admitted to the surgical floor.  He has had some issues with pain   control which was expected given his history of opioid dependence and chronic   pain.  He has been mobilizing effectively with the walker and working with   physical therapy and feels that he would be best served by transfer to a   skilled nursing facility to complete his postoperative rehabilitation.  His   vital signs have been stable.  He has been afebrile.  His dressing is clean,   dry, and intact and I feel he is appropriate for transfer to skilled nursing   facility once accepted.    OPERATIVE SERVICES:  On 11/14/2017, underwent right total hip arthroplasty for   endstage osteoarthritis by myself.    DISCHARGE INSTRUCTIONS:  1.  Patient can be weightbearing as tolerated to the right lower extremity,   but should maintain posterior hip precautions at all times.  2.  He should be notified or should be transferred back to the emergency   department if he develops fevers, chills, nausea, vomiting, shortness of   breath, chest pain or pain uncontrolled by oral pain medication.    TRANSFER MEDICATIONS:  Please refer to medication reconciliation documentation   in Epic.    FOLLOWUP APPOINTMENTS:  The patient should follow up as scheduled in 2 weeks   postop for routine wound check and staple removal.       ____________________________________     MD CHAN Robles / EVGENY    DD:  11/16/2017 10:29:47  DT:  11/16/2017 10:53:34    D#:  [id number]   Job#:  [id number]

## 2017-11-16 NOTE — THERAPY
"Pt making gradual progress toward acute PT goals. Pain continues to be a limiting factor. Improved ambulation distance slightly, but continues with step to rather than step through gait. Pt will continue to benefit from acute skilled PT interventions. Would benefit from placement post acute to optimize post op outcomes.    Physical Therapy Treatment completed.   Bed Mobility:  Supine to Sit: Minimal Assist  Transfers: Sit to Stand: Stand by Assist  Gait: Level Of Assist: Contact Guard Assist with Front-Wheel Walker    x85 ft   Plan of Care: Will benefit from Physical Therapy 5 times per week  Discharge Recommendations: Equipment: Will Continue to Assess for Equipment Needs. Post-acute therapy Discharge to a transitional care facility for continued skilled therapy services.     See \"Rehab Therapy-Acute\" Patient Summary Report for complete documentation.       "

## 2017-11-16 NOTE — PROGRESS NOTES
Discussed pain management with Dr. Chaudhari. No changes at this yime to pain medication as pt reports his pain is under better control.

## 2017-11-16 NOTE — DISCHARGE PLANNING
ADAN called and spoke with Marita at [facility] patient still requires one more night before discharge to SNF. Plan is for Patient to discharge Friday.

## 2017-11-16 NOTE — DISCHARGE PLANNING
Received message from Marita at [facility], referral has been accepted. Transport is okay to be arranged for 3:00 today. Right-faxed H&P, and Diet information.

## 2017-11-16 NOTE — CARE PLAN
Problem: Safety  Goal: Will remain free from injury  Outcome: PROGRESSING AS EXPECTED  Mobility assessed. Pt out of bed with one assist and FWW. Educated on the importance of calling for assistance, verbalizes understanding. Upper bed rails up x2, bed in low/locked position, hourly rounding in place, treaded socks on.     Problem: Pain Management  Goal: Pain level will decrease to patient's comfort goal  Outcome: PROGRESSING AS EXPECTED  Numeric scale to assess pain. Prns per MAR. Rest/reposition for comfort.

## 2017-11-17 VITALS
DIASTOLIC BLOOD PRESSURE: 57 MMHG | HEART RATE: 73 BPM | OXYGEN SATURATION: 97 % | HEIGHT: 77 IN | TEMPERATURE: 98.7 F | BODY MASS INDEX: 23.77 KG/M2 | WEIGHT: 201.28 LBS | SYSTOLIC BLOOD PRESSURE: 94 MMHG | RESPIRATION RATE: 16 BRPM

## 2017-11-17 LAB
GLUCOSE BLD-MCNC: 153 MG/DL (ref 65–99)
GLUCOSE BLD-MCNC: 172 MG/DL (ref 65–99)
GLUCOSE BLD-MCNC: 187 MG/DL (ref 65–99)

## 2017-11-17 PROCEDURE — 700111 HCHG RX REV CODE 636 W/ 250 OVERRIDE (IP): Performed by: ORTHOPAEDIC SURGERY

## 2017-11-17 PROCEDURE — 700112 HCHG RX REV CODE 229: Performed by: ORTHOPAEDIC SURGERY

## 2017-11-17 PROCEDURE — 97116 GAIT TRAINING THERAPY: CPT

## 2017-11-17 PROCEDURE — 82962 GLUCOSE BLOOD TEST: CPT | Mod: 91

## 2017-11-17 PROCEDURE — 700102 HCHG RX REV CODE 250 W/ 637 OVERRIDE(OP): Performed by: ORTHOPAEDIC SURGERY

## 2017-11-17 PROCEDURE — A9270 NON-COVERED ITEM OR SERVICE: HCPCS | Performed by: ORTHOPAEDIC SURGERY

## 2017-11-17 PROCEDURE — 97110 THERAPEUTIC EXERCISES: CPT

## 2017-11-17 RX ORDER — OXYCODONE HYDROCHLORIDE 10 MG/1
10-20 TABLET ORAL
Qty: 28 TAB | Refills: 0 | Status: SHIPPED | OUTPATIENT
Start: 2017-11-17 | End: 2017-11-24

## 2017-11-17 RX ORDER — OXYCODONE HCL 20 MG/1
20 TABLET, FILM COATED, EXTENDED RELEASE ORAL EVERY 12 HOURS
Qty: 14 TAB | Refills: 0 | Status: SHIPPED | OUTPATIENT
Start: 2017-11-17 | End: 2017-11-24

## 2017-11-17 RX ADMIN — OXYCODONE HYDROCHLORIDE 20 MG: 10 TABLET ORAL at 06:21

## 2017-11-17 RX ADMIN — SITAGLIPTIN 50 MG: 50 TABLET, FILM COATED ORAL at 07:58

## 2017-11-17 RX ADMIN — CELECOXIB 200 MG: 200 CAPSULE ORAL at 08:00

## 2017-11-17 RX ADMIN — Medication 100 MG: at 07:59

## 2017-11-17 RX ADMIN — INSULIN HUMAN 1 UNITS: 100 INJECTION, SOLUTION PARENTERAL at 06:22

## 2017-11-17 RX ADMIN — DULOXETINE HYDROCHLORIDE 90 MG: 30 CAPSULE, DELAYED RELEASE ORAL at 07:57

## 2017-11-17 RX ADMIN — OXYCODONE HYDROCHLORIDE 20 MG: 10 TABLET ORAL at 10:14

## 2017-11-17 RX ADMIN — OXYCODONE HYDROCHLORIDE 20 MG: 10 TABLET ORAL at 00:55

## 2017-11-17 RX ADMIN — HYDROCHLOROTHIAZIDE 12.5 MG: 25 TABLET ORAL at 07:59

## 2017-11-17 RX ADMIN — DOCUSATE SODIUM 100 MG: 100 CAPSULE, LIQUID FILLED ORAL at 07:59

## 2017-11-17 RX ADMIN — ACETAMINOPHEN 1000 MG: 500 TABLET ORAL at 00:56

## 2017-11-17 RX ADMIN — VITAMIN D, TAB 1000IU (100/BT) 1000 UNITS: 25 TAB at 07:59

## 2017-11-17 RX ADMIN — INSULIN HUMAN 1 UNITS: 100 INJECTION, SOLUTION PARENTERAL at 10:18

## 2017-11-17 RX ADMIN — MAGNESIUM HYDROXIDE 30 ML: 400 SUSPENSION ORAL at 06:22

## 2017-11-17 RX ADMIN — ENOXAPARIN SODIUM 40 MG: 100 INJECTION SUBCUTANEOUS at 09:05

## 2017-11-17 RX ADMIN — VERAPAMIL HYDROCHLORIDE 240 MG: 240 TABLET, FILM COATED, EXTENDED RELEASE ORAL at 07:58

## 2017-11-17 RX ADMIN — ACETAMINOPHEN 1000 MG: 500 TABLET ORAL at 06:21

## 2017-11-17 RX ADMIN — METFORMIN HYDROCHLORIDE 1000 MG: 500 TABLET, FILM COATED ORAL at 07:58

## 2017-11-17 RX ADMIN — GABAPENTIN 800 MG: 400 CAPSULE ORAL at 07:58

## 2017-11-17 RX ADMIN — CLONAZEPAM 1 MG: 0.5 TABLET ORAL at 07:57

## 2017-11-17 RX ADMIN — OXYCODONE HYDROCHLORIDE 20 MG: 20 TABLET, FILM COATED, EXTENDED RELEASE ORAL at 09:05

## 2017-11-17 ASSESSMENT — PATIENT HEALTH QUESTIONNAIRE - PHQ9
1. LITTLE INTEREST OR PLEASURE IN DOING THINGS: NOT AT ALL
SUM OF ALL RESPONSES TO PHQ QUESTIONS 1-9: 0
SUM OF ALL RESPONSES TO PHQ9 QUESTIONS 1 AND 2: 0
2. FEELING DOWN, DEPRESSED, IRRITABLE, OR HOPELESS: NOT AT ALL

## 2017-11-17 ASSESSMENT — GAIT ASSESSMENTS
ASSISTIVE DEVICE: FRONT WHEEL WALKER
GAIT LEVEL OF ASSIST: STAND BY ASSIST
DISTANCE (FEET): 150

## 2017-11-17 ASSESSMENT — PAIN SCALES - GENERAL
PAINLEVEL_OUTOF10: 7
PAINLEVEL_OUTOF10: 6

## 2017-11-17 NOTE — DISCHARGE PLANNING
ADAN Perez received confirmation that pt will be picked up by [facility] transport at 0930 and brought to [facility] this morning and d.c summary does not need to be updated since it is within 24 hours.  ADAN notified GWENDOLYN Reeves and requested bs RN be notified.  ADAN bringing COBRA shortly.

## 2017-11-17 NOTE — PROGRESS NOTES
Received call from [facility] requesting pts scripts. Paged Dr. Chaudhari regarding pt's scripts, awaiting call back.

## 2017-11-17 NOTE — PROGRESS NOTES
Spoke with Damaris the director at [facility], she will not accept the paper I ffaxed her with the CASI#.

## 2017-11-17 NOTE — DISCHARGE PLANNING
Received call from Marita from [facility], pt was transferred without Pain Medication Prescription. Spoke to ANNETTE Jaramillo, she will have Dr. Chaudhari fax rx to Marita at [facility]. [facility].

## 2017-11-17 NOTE — CARE PLAN
Problem: Safety  Goal: Will remain free from falls    Intervention: Implement fall precautions  Assess that bed alarm is on as well as reminding the pt to call for assistance before attempting to ambulate      Problem: Pain Management  Goal: Pain level will decrease to patient's comfort goal    Intervention: Follow pain managment plan developed in collaboration with patient and Interdisciplinary Team  Continue to assess pt for breakthrough pain and medicate according to MAR.

## 2017-11-17 NOTE — PROGRESS NOTES
Received report from NOC RN; assumed pt care. Pt complains of 6/10 pain. Polar ice to the RT hip, pt able to dorsi/plantar flex w/out difficulty. +CMS. Pt notified to call for assistance, call light within reach.

## 2017-11-17 NOTE — DISCHARGE PLANNING
Transportation has been arranged with [facility] at [facility]. Pt's transport is scheduled for 9:30 today via [facility]. [facility]    Discharge Summary and MAR have been right-faxed.

## 2017-11-17 NOTE — DISCHARGE INSTRUCTIONS
Discharge Instructions  --okay to transfer to SNF when accepted  --WBAT RLE with walker  --maintain posterior hip precautions  --leave surgical dressing in place  --fu as scheduled 2 weeks postop      Discharged to group home by medical transportation with self. Discharged via wheelchair, hospital escort: Yes.  Special equipment needed: Walker    Be sure to schedule a follow-up appointment with your primary care doctor or any specialists as instructed.     Discharge Plan:   Influenza Vaccine Indication: Not indicated: Previously immunized this influenza season and > 8 years of age    I understand that a diet low in cholesterol, fat, and sodium is recommended for good health. Unless I have been given specific instructions below for another diet, I accept this instruction as my diet prescription.   Other diet: Diabetic     Special Instructions: Discharge instructions for the Orthopedic Patient    Follow up with Primary Care Physician within 2 weeks of discharge to home, regarding:  Review of medications and diagnostic testing.  Surveillance for medical complications.  Workup and treatment of osteoporosis, if appropriate.     -Is this a Joint Replacement patient? Yes   Total Joint Hip Replacement Discharge Instructions    Pain  - The goal is to slowly wean off the prescription pain medicine.  - Ice can be used for pain control.  20 minutes at a time is recommended, and never directly against your skin or incision.  - Most patients are off the pain pills by 3 weeks; others may require a low level of pain medications for many months. If your pain continues to be severe, follow up with your physician.  Infection  Deep hip joint infections that require removal of the prostheses occur in less than 0.1% of patients. Lesser infections in the skin (cellulites) are more common and much more easily treated.  - Keep the incision as clean and dry as possible.  - Always wash your hands before touching your incision.  - Skin  infections tend to develop around 7-10 days after surgery, most can be treated with oral antibiotics.  - Dental Care should be delayed for 3 months after surgery, your surgeon recommends taking a dose of antibiotics 1 hour prior to any dental procedure.  After 2 years, most surgeons recommend antibiotics only before an extensive procedure.  Ask your surgeon what he recommends.  - Signs and symptoms of infection can include:  low grade fever, redness, pain, swelling and drainage from your incision.  Notify your surgeon immediately if you develop any of these symptoms.  Post op Disturbances  - Bowel habits - constipation is extremely common and is caused by a combination of anesthesia, lack of mobility and pain medicine.  Use stool softeners or laxatives if necessary. It is important not to ignore this problem, as bowel obstructions can be a serious complication after joint replacement surgery.  - Mood/Energy Level - Many patients experience a lack of energy and endurance for up to 2-3 months after surgery.  Some may also feel down and can even become depressed.  This is likely due to the postoperative anemia, change in activity level, lack of sleep, pain medicine and just the emotional reaction to the surgery itself that is a big disruption in a person’s life.  This usually passes.  If symptoms persist, follow up with your primary physician.  - Returning to work - Your surgeon will give you more specific instructions.  Generally, if you work a sedentary job requiring little standing or walking, most patients may return within 2-6 weeks.  Manual labor jobs involving walking, lifting and standing may take 3-4 months.  Your surgeon’s office can provide a release to part-time or light duty work early on in your recovery and progress you to full duty as able.  - Driving - You can begin driving an automatic shift car in 4 to 8 weeks, provided you are no longer taking narcotic pain medication. If you have a stick-shift car  and your right hip was replaced, do not begin driving until your doctor says you can.   - Avoiding falls -  throw rugs and tack down loose carpeting.  Be aware of floor hazards such as pets, small objects or uneven surfaces.   -  Airport Metal Detectors - The sensitivity of metal detectors varies and it is likely that your prosthesis will cause an alarm. Inform the  that you have an artificial joint.  Diet  - Resume your normal diet as tolerated.  - It is important to achieve a healthy nutritional status by eating a well balanced diet on a regular basis.  - Your physician may recommend that you take iron and vitamin supplements.   - Continue to drink plenty of fluids.  Shower/Bathing  - You may shower as soon as you get home from the hospital unless otherwise instructed.  - Keep your incision out of water.  To keep the incision dry when showering, cover it with a plastic bag or plastic wrap.  - Pat incision dry if it gets wet.  Don’t rub.  - Do not submerge in a bath until staples are out and the incision is completely healed. (Approximately 6-8 weeks after surgery).  Dressing Change:  Procedure (if recommended by your physician)  - Wash hands.  - Open all dressing change materials.  - Remove old dressing and discard.  - Inspect incision for redness, increase in clear drainage, yellow/green drainage, odor and surrounding skin hot to touch.  -  ABD (large gauze) pad by one corner and lay over the incision.  Be careful not to touch the inside of the dressing that will lay over the incision.  - Secure in place as instructed (Ace wrap or tape).    Swelling/Bruising  - Swelling is normal after hip replacement and can involve the thigh, knee, calf and foot.  - Swelling can last from 3-6 months.  - Elevate your leg higher than your heart while reclining.  The first week you are home you should elevate your leg an equal amount of time, as you are active.    - Anti-inflammatory pills can be  taken once you have stopped the blood thinners.  - The swelling is usually worse after you go home since you are upright for longer periods of time.  - Bruising is common and can involve the entire leg including the thigh, calf and even foot.  Bruising often does not appear until after you arrive home and it can be quite dramatic- purple, black, green.  The bruising you can see is not usually concerning and will subside without any treatment.      Blood Clot Prevention  Blood clots in the legs and the less common, but frightening, clots that travel to the lungs are a real focus of our preventative. Most patients are at standard risk for them, but those patients who are at higher risk include people who have had previous clots, a family history of clotting, smoking, diabetes, obesity, advanced age, use of estrogen and a sedentary lifestyle.    - Signs of blood clots in legs - Swelling in thigh, calf or ankle that does not go down with elevation.  Pain, heat and tenderness in calf, back of calf or groin area.  NOTE: blood clots can occur in either leg.  - You have been receiving anticoagulant therapy (blood thinners) in the hospital and you may be instructed to continue at home depending on your risk factors.  - Your risk for developing a clot continues for up to 2-3 months after surgery.  You should avoid prolonged sitting and dehydration during that time (long air trips and car trips).  If you do take a trip during this time, please get up and move around every 1- 1.5 hours.  - If you are prescribed blood thinning medication for home, follow instructions as directed. (Handouts provided if applicable).      Activity    Once you get home, you should stay active. The key is not to overdo it! While you can expect some good days and some bad days, you should notice a gradual improvement over time you should notice a gradual improvement and a gradual increase in your endurance over the next 6 to 12 months.    - Weight  Bearing - If you have undergone cemented or hybrid hip replacement, you can put some weight on the leg immediately using a cane or walker, and you should continue to use some support for 4 to 6 weeks to help the muscles recover.   - Sleeping Positions - Sleep on your back with your legs slightly apart or on your side with a regular pillow between your knees. Be sure to use the pillow for at least 6 weeks, or until your doctor says you can do without it. Sleeping on your stomach should be all right  - Sitting - For at least the first 3 months, sit only in chairs that have arms. Do not sit on low chairs, low stools, or reclining chairs. Do not cross your legs at the knees. The physical therapist will show you how to sit and stand from a chair, keeping your affected leg out in front of you. Get up and move around on a regular basis--at least once every hour.  - Walking - Walk as much as you like once your doctor gives you the go-ahead, but remember that walking is no substitute for your prescribed exercises. Walking with a pair of trekking poles is helpful and adds as much as 40% to the exercise you get when you walk  - Therapy may be needed in some cases, to strengthen your muscles and improve your gait (walking pattern).  This decision will be made at your post-operative appointment.  Follow your therapist recommended post-operative exercises (handout provided by Therapist).  - Swimming is also recommended; you can begin as soon as the sutures have been removed and the wound is healed, approximately 6 to 8 weeks after surgery. Using a pair of training fins may make swimming a more enjoyable and effective exercise.  - Other activities - Lower impact activities are preferred.  If you have specific questions, consult your Surgeon.    - Sexual activity - Your surgeon can tell you when it should be safe to resume sexual activity.      When to Call the Doctor   Call the physician if:   - Fever over 100.5? F  - Increased  pain, drainage, redness, odor or heat around the incision area  - Shaking chills  - Increased knee pain with activity and rest  - Increased pain in calf, tenderness or redness above or below the knee  - Increased swelling of calf, ankle, foot  - Sudden increased shortness of breath, sudden onset of chest pain, localized chest pain with coughing  - Incision opening  Or, if there are any questions or concerns about medications or care.       -Is this patient being discharged with medication to prevent blood clots?  Yes, Lovenox Enoxaparin injection  What is this medicine?  ENOXAPARIN (ee nox a PA rin) is used after knee, hip, or abdominal surgeries to prevent blood clotting. It is also used to treat existing blood clots in the lungs or in the veins.  This medicine may be used for other purposes; ask your health care provider or pharmacist if you have questions.  COMMON BRAND NAME(S): Lovenox  What should I tell my health care provider before I take this medicine?  They need to know if you have any of these conditions:  -bleeding disorders, hemorrhage, or hemophilia  -infection of the heart or heart valves  -kidney or liver disease  -previous stroke  -prosthetic heart valve  -recent surgery or delivery of a baby  -ulcer in the stomach or intestine, diverticulitis, or other bowel disease  -an unusual or allergic reaction to enoxaparin, heparin, pork or pork products, other medicines, foods, dyes, or preservatives  -pregnant or trying to get pregnant  -breast-feeding  How should I use this medicine?  This medicine is for injection under the skin. It is usually given by a health-care professional. You or a family member may be trained on how to give the injections. If you are to give yourself injections, make sure you understand how to use the syringe, measure the dose if necessary, and give the injection. To avoid bruising, do not rub the site where this medicine has been injected. Do not take your medicine more often  than directed. Do not stop taking except on the advice of your doctor or health care professional.  Make sure you receive a puncture-resistant container to dispose of the needles and syringes once you have finished with them. Do not reuse these items. Return the container to your doctor or health care professional for proper disposal.  Talk to your pediatrician regarding the use of this medicine in children. Special care may be needed.  Overdosage: If you think you have taken too much of this medicine contact a poison control center or emergency room at once.  NOTE: This medicine is only for you. Do not share this medicine with others.  What if I miss a dose?  If you miss a dose, take it as soon as you can. If it is almost time for your next dose, take only that dose. Do not take double or extra doses.  What may interact with this medicine?  Do not take this medicine with any of the following medications:  -aspirin and aspirin-like medicines  -heparin  -mifepristone  -palifermin  -warfarin   This medicine may also interact with the following medications:  -cilostazol  -clopidogrel  -dipyridamole  -NSAIDs, medicines for pain and inflammation, like ibuprofen or naproxen  -sulfinpyrazone  -ticlopidine  This list may not describe all possible interactions. Give your health care provider a list of all the medicines, herbs, non-prescription drugs, or dietary supplements you use. Also tell them if you smoke, drink alcohol, or use illegal drugs. Some items may interact with your medicine.  What should I watch for while using this medicine?  Visit your doctor or health care professional for regular checks on your progress. Your condition will be monitored carefully while you are receiving this medicine.  If you are going to have surgery, tell your doctor or health care professional that you are taking this medicine.  Do not stop taking this medicine without first talking to your doctor. Be sure to refill your prescription  before you run out of medicine.  Avoid sports and activities that might cause injury while you are using this medicine. Severe falls or injuries can cause unseen bleeding. Be careful when using sharp tools or knives. Consider using an electric razor. Take special care brushing or flossing your teeth. Report any injuries, bruising, or red spots on the skin to your doctor or health care professional.  What side effects may I notice from receiving this medicine?  Side effects that you should report to your doctor or health care professional as soon as possible:  -allergic reactions like skin rash, itching or hives, swelling of the face, lips, or tongue  -dark urine  -feeling faint or lightheaded, falls  -fever  -heavy menstrual bleeding  -signs and symptoms of bleeding such as bloody or black, tarry stools; red or dark-brown urine; spitting up blood or brown material that looks like coffee grounds; red spots on the skin; unusual bruising or bleeding from the eye, gums, or nose  -signs and symptoms of a blood clot such as breathing problems; changes in vision; chest pain; severe, sudden headache; pain, swelling, warmth in the leg; trouble speaking; sudden numbness or weakness of the face, arm or leg  Side effects that usually do not require medical attention (report to your doctor or health care professional if they continue or are bothersome):  -pain or irritation at the injection site  This list may not describe all possible side effects. Call your doctor for medical advice about side effects. You may report side effects to FDA at 8-897-FDA-7458.  Where should I keep my medicine?  Keep out of the reach of children.  Store at room temperature between 15 and 30 degrees C (59 and 86 degrees F). Do not freeze. If your injections have been specially prepared, you may need to store them in the refrigerator. Ask your pharmacist. Throw away any unused medicine after the expiration date.  NOTE: This sheet is a summary. It may  not cover all possible information. If you have questions about this medicine, talk to your doctor, pharmacist, or health care provider.  © 2014, [employer]. (3/31/2014 10:04:27 AM)      · Is patient discharged on Warfarin / Coumadin?   No     · Is patient Post Blood Transfusion?  No    Depression / Suicide Risk    As you are discharged from this [facility] facility, it is important to learn how to keep safe from harming yourself.    Recognize the warning signs:  · Abrupt changes in personality, positive or negative- including increase in energy   · Giving away possessions  · Change in eating patterns- significant weight changes-  positive or negative  · Change in sleeping patterns- unable to sleep or sleeping all the time   · Unwillingness or inability to communicate  · Depression  · Unusual sadness, discouragement and loneliness  · Talk of wanting to die  · Neglect of personal appearance   · Rebelliousness- reckless behavior  · Withdrawal from people/activities they love  · Confusion- inability to concentrate     If you or a loved one observes any of these behaviors or has concerns about self-harm, here's what you can do:  · Talk about it- your feelings and reasons for harming yourself  · Remove any means that you might use to hurt yourself (examples: pills, rope, extension cords, firearm)  · Get professional help from the community (Mental Health, Substance Abuse, psychological counseling)  · Do not be alone:Call your Safe Contact- someone whom you trust who will be there for you.  · Call your local CRISIS HOTLINE 651-0364 or 572-174-6374  · Call your local Children's Mobile Crisis Response Team [place] (955) 587-1799 or www.GSOUND  · Call the toll free National Suicide Prevention Hotlines   · National Suicide Prevention Lifeline 755-536-YJLF (1082)  · National Hope Line Network 800-SUICIDE (962-4840)

## 2017-11-17 NOTE — PROGRESS NOTES
Pt sitting up in chair, eating lunch. Respirations even and unlabored. No other needs at this time.

## 2017-11-17 NOTE — PROGRESS NOTES
Spoke with RN in OR with Dr. Chaudhari. Dr. Chaudhari is unable to write a prescription and fax it right now because he is operating on a pt. MD will be able to fax prescriptions once done with surgery in 1-2 hours. Will call an inform Damaris the director at [facility].

## 2017-11-17 NOTE — THERAPY
"Physical Therapy Treatment completed.   Bed Mobility:  Supine to Sit:  (UIC)  Transfers: Sit to Stand: Stand by Assist  Gait: Level Of Assist: Stand by Assist with Front-Wheel Walker       Plan of Care: Will benefit from Physical Therapy 7 times per week  Discharge Recommendations: Equipment: Will Continue to Assess for Equipment Needs. Post-acute therapy Discharge to a transitional care facility for continued skilled therapy services.     See \"Rehab Therapy-Acute\" Patient Summary Report for complete documentation.       "

## 2017-11-17 NOTE — PROGRESS NOTES
Gave report to Eugene at [facility], informed her on pt status. Gave her a call back number for any questions.

## 2017-11-17 NOTE — PROGRESS NOTES
Report received from day RN, POC discussed, walking rounds completed, patient resting comfortably and family at bedside, all orders, medications, and lines verifed, no s/s distress, call light within reach and will continue to monitor.

## 2019-04-09 ENCOUNTER — APPOINTMENT (OUTPATIENT)
Dept: RADIOLOGY | Facility: MEDICAL CENTER | Age: 64
DRG: 468 | End: 2019-04-09
Attending: ORTHOPAEDIC SURGERY
Payer: MEDICARE

## 2019-04-09 ENCOUNTER — ANESTHESIA (OUTPATIENT)
Dept: SURGERY | Facility: MEDICAL CENTER | Age: 64
DRG: 468 | End: 2019-04-09
Payer: MEDICARE

## 2019-04-09 ENCOUNTER — ANESTHESIA EVENT (OUTPATIENT)
Dept: SURGERY | Facility: MEDICAL CENTER | Age: 64
DRG: 468 | End: 2019-04-09
Payer: MEDICARE

## 2019-04-09 ENCOUNTER — HOSPITAL ENCOUNTER (INPATIENT)
Facility: MEDICAL CENTER | Age: 64
LOS: 1 days | DRG: 468 | End: 2019-04-10
Attending: ORTHOPAEDIC SURGERY | Admitting: ORTHOPAEDIC SURGERY
Payer: MEDICARE

## 2019-04-09 DIAGNOSIS — G89.18 ACUTE POST-OPERATIVE PAIN: ICD-10-CM

## 2019-04-09 DIAGNOSIS — Z96.641 STATUS POST TOTAL REPLACEMENT OF RIGHT HIP: ICD-10-CM

## 2019-04-09 LAB
ABO GROUP BLD: NORMAL
ABO GROUP BLD: NORMAL
ANION GAP SERPL CALC-SCNC: 8 MMOL/L (ref 0–11.9)
BLD GP AB SCN SERPL QL: NORMAL
BUN SERPL-MCNC: 10 MG/DL (ref 8–22)
CALCIUM SERPL-MCNC: 9.6 MG/DL (ref 8.5–10.5)
CHLORIDE SERPL-SCNC: 103 MMOL/L (ref 96–112)
CO2 SERPL-SCNC: 26 MMOL/L (ref 20–33)
CREAT SERPL-MCNC: 0.81 MG/DL (ref 0.5–1.4)
EKG IMPRESSION: NORMAL
ERYTHROCYTE [DISTWIDTH] IN BLOOD BY AUTOMATED COUNT: 53 FL (ref 35.9–50)
GLUCOSE BLD-MCNC: 115 MG/DL (ref 65–99)
GLUCOSE BLD-MCNC: 181 MG/DL (ref 65–99)
GLUCOSE SERPL-MCNC: 127 MG/DL (ref 65–99)
HCT VFR BLD AUTO: 48.1 % (ref 42–52)
HGB BLD-MCNC: 16.1 G/DL (ref 14–18)
MCH RBC QN AUTO: 29.6 PG (ref 27–33)
MCHC RBC AUTO-ENTMCNC: 33.5 G/DL (ref 33.7–35.3)
MCV RBC AUTO: 88.4 FL (ref 81.4–97.8)
PLATELET # BLD AUTO: 247 K/UL (ref 164–446)
PMV BLD AUTO: 9.3 FL (ref 9–12.9)
POTASSIUM SERPL-SCNC: 3.8 MMOL/L (ref 3.6–5.5)
RBC # BLD AUTO: 5.44 M/UL (ref 4.7–6.1)
RH BLD: NORMAL
RH BLD: NORMAL
SODIUM SERPL-SCNC: 137 MMOL/L (ref 135–145)
WBC # BLD AUTO: 8 K/UL (ref 4.8–10.8)

## 2019-04-09 PROCEDURE — 160035 HCHG PACU - 1ST 60 MINS PHASE I: Performed by: ORTHOPAEDIC SURGERY

## 2019-04-09 PROCEDURE — 501838 HCHG SUTURE GENERAL: Performed by: ORTHOPAEDIC SURGERY

## 2019-04-09 PROCEDURE — 0SP909Z REMOVAL OF LINER FROM RIGHT HIP JOINT, OPEN APPROACH: ICD-10-PCS | Performed by: ORTHOPAEDIC SURGERY

## 2019-04-09 PROCEDURE — 160009 HCHG ANES TIME/MIN: Performed by: ORTHOPAEDIC SURGERY

## 2019-04-09 PROCEDURE — 700102 HCHG RX REV CODE 250 W/ 637 OVERRIDE(OP): Performed by: ORTHOPAEDIC SURGERY

## 2019-04-09 PROCEDURE — 86900 BLOOD TYPING SEROLOGIC ABO: CPT

## 2019-04-09 PROCEDURE — 0SPR0JZ REMOVAL OF SYNTHETIC SUBSTITUTE FROM RIGHT HIP JOINT, FEMORAL SURFACE, OPEN APPROACH: ICD-10-PCS | Performed by: ORTHOPAEDIC SURGERY

## 2019-04-09 PROCEDURE — 700105 HCHG RX REV CODE 258: Performed by: ORTHOPAEDIC SURGERY

## 2019-04-09 PROCEDURE — 160048 HCHG OR STATISTICAL LEVEL 1-5: Performed by: ORTHOPAEDIC SURGERY

## 2019-04-09 PROCEDURE — A9270 NON-COVERED ITEM OR SERVICE: HCPCS | Performed by: ANESTHESIOLOGY

## 2019-04-09 PROCEDURE — 85027 COMPLETE CBC AUTOMATED: CPT

## 2019-04-09 PROCEDURE — 160036 HCHG PACU - EA ADDL 30 MINS PHASE I: Performed by: ORTHOPAEDIC SURGERY

## 2019-04-09 PROCEDURE — 36415 COLL VENOUS BLD VENIPUNCTURE: CPT

## 2019-04-09 PROCEDURE — 700102 HCHG RX REV CODE 250 W/ 637 OVERRIDE(OP): Performed by: ANESTHESIOLOGY

## 2019-04-09 PROCEDURE — 700101 HCHG RX REV CODE 250: Performed by: ANESTHESIOLOGY

## 2019-04-09 PROCEDURE — 700111 HCHG RX REV CODE 636 W/ 250 OVERRIDE (IP): Performed by: ANESTHESIOLOGY

## 2019-04-09 PROCEDURE — 86850 RBC ANTIBODY SCREEN: CPT

## 2019-04-09 PROCEDURE — 0SRR03Z REPLACEMENT OF RIGHT HIP JOINT, FEMORAL SURFACE WITH CERAMIC SYNTHETIC SUBSTITUTE, OPEN APPROACH: ICD-10-PCS | Performed by: ORTHOPAEDIC SURGERY

## 2019-04-09 PROCEDURE — 160042 HCHG SURGERY MINUTES - EA ADDL 1 MIN LEVEL 5: Performed by: ORTHOPAEDIC SURGERY

## 2019-04-09 PROCEDURE — 0SUA09Z SUPPLEMENT RIGHT HIP JOINT, ACETABULAR SURFACE WITH LINER, OPEN APPROACH: ICD-10-PCS | Performed by: ORTHOPAEDIC SURGERY

## 2019-04-09 PROCEDURE — 502578 HCHG PACK, TOTAL HIP: Performed by: ORTHOPAEDIC SURGERY

## 2019-04-09 PROCEDURE — 160002 HCHG RECOVERY MINUTES (STAT): Performed by: ORTHOPAEDIC SURGERY

## 2019-04-09 PROCEDURE — 80048 BASIC METABOLIC PNL TOTAL CA: CPT

## 2019-04-09 PROCEDURE — 700111 HCHG RX REV CODE 636 W/ 250 OVERRIDE (IP): Performed by: ORTHOPAEDIC SURGERY

## 2019-04-09 PROCEDURE — A6223 GAUZE >16<=48 NO W/SAL W/O B: HCPCS | Performed by: ORTHOPAEDIC SURGERY

## 2019-04-09 PROCEDURE — 72170 X-RAY EXAM OF PELVIS: CPT

## 2019-04-09 PROCEDURE — 700111 HCHG RX REV CODE 636 W/ 250 OVERRIDE (IP)

## 2019-04-09 PROCEDURE — 700112 HCHG RX REV CODE 229: Performed by: ORTHOPAEDIC SURGERY

## 2019-04-09 PROCEDURE — 93010 ELECTROCARDIOGRAM REPORT: CPT | Performed by: INTERNAL MEDICINE

## 2019-04-09 PROCEDURE — A9270 NON-COVERED ITEM OR SERVICE: HCPCS | Performed by: ORTHOPAEDIC SURGERY

## 2019-04-09 PROCEDURE — 502000 HCHG MISC OR IMPLANTS RC 0278: Performed by: ORTHOPAEDIC SURGERY

## 2019-04-09 PROCEDURE — 93005 ELECTROCARDIOGRAM TRACING: CPT | Performed by: ORTHOPAEDIC SURGERY

## 2019-04-09 PROCEDURE — 700101 HCHG RX REV CODE 250: Performed by: ORTHOPAEDIC SURGERY

## 2019-04-09 PROCEDURE — 82962 GLUCOSE BLOOD TEST: CPT | Mod: 91

## 2019-04-09 PROCEDURE — 700101 HCHG RX REV CODE 250

## 2019-04-09 PROCEDURE — 160031 HCHG SURGERY MINUTES - 1ST 30 MINS LEVEL 5: Performed by: ORTHOPAEDIC SURGERY

## 2019-04-09 PROCEDURE — A4314 CATH W/DRAINAGE 2-WAY LATEX: HCPCS | Performed by: ORTHOPAEDIC SURGERY

## 2019-04-09 PROCEDURE — 770001 HCHG ROOM/CARE - MED/SURG/GYN PRIV*

## 2019-04-09 PROCEDURE — 700105 HCHG RX REV CODE 258: Performed by: ANESTHESIOLOGY

## 2019-04-09 PROCEDURE — 86901 BLOOD TYPING SEROLOGIC RH(D): CPT

## 2019-04-09 DEVICE — IMPLANTABLE DEVICE: Type: IMPLANTABLE DEVICE | Site: HIP | Status: FUNCTIONAL

## 2019-04-09 RX ORDER — SODIUM CHLORIDE, SODIUM LACTATE, POTASSIUM CHLORIDE, CALCIUM CHLORIDE 600; 310; 30; 20 MG/100ML; MG/100ML; MG/100ML; MG/100ML
INJECTION, SOLUTION INTRAVENOUS
Status: DISCONTINUED | OUTPATIENT
Start: 2019-04-09 | End: 2019-04-09 | Stop reason: SURG

## 2019-04-09 RX ORDER — AMOXICILLIN 250 MG
1 CAPSULE ORAL NIGHTLY
Status: DISCONTINUED | OUTPATIENT
Start: 2019-04-09 | End: 2019-04-10 | Stop reason: HOSPADM

## 2019-04-09 RX ORDER — OMEPRAZOLE 20 MG/1
40 CAPSULE, DELAYED RELEASE ORAL
Status: DISCONTINUED | OUTPATIENT
Start: 2019-04-09 | End: 2019-04-10 | Stop reason: HOSPADM

## 2019-04-09 RX ORDER — DOCUSATE SODIUM 100 MG/1
100 CAPSULE, LIQUID FILLED ORAL 2 TIMES DAILY
Status: DISCONTINUED | OUTPATIENT
Start: 2019-04-09 | End: 2019-04-10 | Stop reason: HOSPADM

## 2019-04-09 RX ORDER — OXYCODONE HYDROCHLORIDE 5 MG/1
5 TABLET ORAL
Status: DISCONTINUED | OUTPATIENT
Start: 2019-04-09 | End: 2019-04-10 | Stop reason: HOSPADM

## 2019-04-09 RX ORDER — SODIUM CHLORIDE 9 MG/ML
INJECTION, SOLUTION INTRAVENOUS
Status: COMPLETED
Start: 2019-04-09 | End: 2019-04-10

## 2019-04-09 RX ORDER — CEFAZOLIN SODIUM 2 G/100ML
2 INJECTION, SOLUTION INTRAVENOUS
Status: DISCONTINUED | OUTPATIENT
Start: 2019-04-09 | End: 2019-04-09 | Stop reason: HOSPADM

## 2019-04-09 RX ORDER — HALOPERIDOL 5 MG/ML
1 INJECTION INTRAMUSCULAR
Status: DISCONTINUED | OUTPATIENT
Start: 2019-04-09 | End: 2019-04-09 | Stop reason: HOSPADM

## 2019-04-09 RX ORDER — HYDRALAZINE HYDROCHLORIDE 20 MG/ML
5 INJECTION INTRAMUSCULAR; INTRAVENOUS
Status: DISCONTINUED | OUTPATIENT
Start: 2019-04-09 | End: 2019-04-09 | Stop reason: HOSPADM

## 2019-04-09 RX ORDER — CEFAZOLIN SODIUM 2 G/100ML
2 INJECTION, SOLUTION INTRAVENOUS EVERY 8 HOURS
Status: COMPLETED | OUTPATIENT
Start: 2019-04-09 | End: 2019-04-10

## 2019-04-09 RX ORDER — HYDROMORPHONE HYDROCHLORIDE 1 MG/ML
0.2 INJECTION, SOLUTION INTRAMUSCULAR; INTRAVENOUS; SUBCUTANEOUS
Status: DISCONTINUED | OUTPATIENT
Start: 2019-04-09 | End: 2019-04-09 | Stop reason: HOSPADM

## 2019-04-09 RX ORDER — ACETAMINOPHEN 500 MG
1000 TABLET ORAL ONCE
Status: COMPLETED | OUTPATIENT
Start: 2019-04-09 | End: 2019-04-09

## 2019-04-09 RX ORDER — ACETAMINOPHEN 500 MG
1000 TABLET ORAL EVERY 6 HOURS
Status: DISCONTINUED | OUTPATIENT
Start: 2019-04-09 | End: 2019-04-10 | Stop reason: HOSPADM

## 2019-04-09 RX ORDER — HYDROMORPHONE HYDROCHLORIDE 1 MG/ML
0.4 INJECTION, SOLUTION INTRAMUSCULAR; INTRAVENOUS; SUBCUTANEOUS
Status: DISCONTINUED | OUTPATIENT
Start: 2019-04-09 | End: 2019-04-09 | Stop reason: HOSPADM

## 2019-04-09 RX ORDER — GABAPENTIN 300 MG/1
300 CAPSULE ORAL ONCE
Status: COMPLETED | OUTPATIENT
Start: 2019-04-09 | End: 2019-04-09

## 2019-04-09 RX ORDER — MORPHINE SULFATE 4 MG/ML
4 INJECTION, SOLUTION INTRAMUSCULAR; INTRAVENOUS
Status: DISCONTINUED | OUTPATIENT
Start: 2019-04-09 | End: 2019-04-10 | Stop reason: HOSPADM

## 2019-04-09 RX ORDER — OXYCODONE HCL 5 MG/5 ML
10 SOLUTION, ORAL ORAL
Status: DISCONTINUED | OUTPATIENT
Start: 2019-04-09 | End: 2019-04-09 | Stop reason: HOSPADM

## 2019-04-09 RX ORDER — AMOXICILLIN 250 MG
1 CAPSULE ORAL
Status: DISCONTINUED | OUTPATIENT
Start: 2019-04-09 | End: 2019-04-10 | Stop reason: HOSPADM

## 2019-04-09 RX ORDER — OXYCODONE HYDROCHLORIDE 10 MG/1
10 TABLET ORAL
Status: DISCONTINUED | OUTPATIENT
Start: 2019-04-09 | End: 2019-04-10 | Stop reason: HOSPADM

## 2019-04-09 RX ORDER — LIDOCAINE HYDROCHLORIDE 10 MG/ML
INJECTION, SOLUTION INFILTRATION; PERINEURAL
Status: COMPLETED
Start: 2019-04-09 | End: 2019-04-09

## 2019-04-09 RX ORDER — KETOROLAC TROMETHAMINE 30 MG/ML
30 INJECTION, SOLUTION INTRAMUSCULAR; INTRAVENOUS EVERY 6 HOURS
Status: COMPLETED | OUTPATIENT
Start: 2019-04-09 | End: 2019-04-10

## 2019-04-09 RX ORDER — SODIUM CHLORIDE, SODIUM LACTATE, POTASSIUM CHLORIDE, CALCIUM CHLORIDE 600; 310; 30; 20 MG/100ML; MG/100ML; MG/100ML; MG/100ML
INJECTION, SOLUTION INTRAVENOUS CONTINUOUS
Status: DISCONTINUED | OUTPATIENT
Start: 2019-04-09 | End: 2019-04-09 | Stop reason: HOSPADM

## 2019-04-09 RX ORDER — CELECOXIB 200 MG/1
200 CAPSULE ORAL ONCE
Status: COMPLETED | OUTPATIENT
Start: 2019-04-09 | End: 2019-04-09

## 2019-04-09 RX ORDER — HYDROMORPHONE HYDROCHLORIDE 1 MG/ML
0.1 INJECTION, SOLUTION INTRAMUSCULAR; INTRAVENOUS; SUBCUTANEOUS
Status: DISCONTINUED | OUTPATIENT
Start: 2019-04-09 | End: 2019-04-09 | Stop reason: HOSPADM

## 2019-04-09 RX ORDER — VERAPAMIL HYDROCHLORIDE 240 MG/1
240 TABLET, FILM COATED, EXTENDED RELEASE ORAL
Status: DISCONTINUED | OUTPATIENT
Start: 2019-04-10 | End: 2019-04-10 | Stop reason: HOSPADM

## 2019-04-09 RX ORDER — ENEMA 19; 7 G/133ML; G/133ML
1 ENEMA RECTAL
Status: DISCONTINUED | OUTPATIENT
Start: 2019-04-09 | End: 2019-04-10 | Stop reason: HOSPADM

## 2019-04-09 RX ORDER — CEFAZOLIN SODIUM 1 G/3ML
INJECTION, POWDER, FOR SOLUTION INTRAMUSCULAR; INTRAVENOUS PRN
Status: DISCONTINUED | OUTPATIENT
Start: 2019-04-09 | End: 2019-04-09 | Stop reason: SURG

## 2019-04-09 RX ORDER — INSULIN GLARGINE 100 [IU]/ML
24 INJECTION, SOLUTION SUBCUTANEOUS EVERY EVENING
Status: DISCONTINUED | OUTPATIENT
Start: 2019-04-09 | End: 2019-04-10 | Stop reason: HOSPADM

## 2019-04-09 RX ORDER — ONDANSETRON 2 MG/ML
4 INJECTION INTRAMUSCULAR; INTRAVENOUS
Status: DISCONTINUED | OUTPATIENT
Start: 2019-04-09 | End: 2019-04-09 | Stop reason: HOSPADM

## 2019-04-09 RX ORDER — ONDANSETRON 2 MG/ML
INJECTION INTRAMUSCULAR; INTRAVENOUS PRN
Status: DISCONTINUED | OUTPATIENT
Start: 2019-04-09 | End: 2019-04-09 | Stop reason: SURG

## 2019-04-09 RX ORDER — POLYETHYLENE GLYCOL 3350 17 G/17G
1 POWDER, FOR SOLUTION ORAL 2 TIMES DAILY PRN
Status: DISCONTINUED | OUTPATIENT
Start: 2019-04-09 | End: 2019-04-10 | Stop reason: HOSPADM

## 2019-04-09 RX ORDER — BISACODYL 10 MG
10 SUPPOSITORY, RECTAL RECTAL
Status: DISCONTINUED | OUTPATIENT
Start: 2019-04-09 | End: 2019-04-10 | Stop reason: HOSPADM

## 2019-04-09 RX ORDER — SODIUM CHLORIDE, SODIUM LACTATE, POTASSIUM CHLORIDE, CALCIUM CHLORIDE 600; 310; 30; 20 MG/100ML; MG/100ML; MG/100ML; MG/100ML
INJECTION, SOLUTION INTRAVENOUS CONTINUOUS
Status: DISPENSED | OUTPATIENT
Start: 2019-04-09 | End: 2019-04-10

## 2019-04-09 RX ORDER — CLONAZEPAM 1 MG/1
2 TABLET ORAL EVERY EVENING
Status: DISCONTINUED | OUTPATIENT
Start: 2019-04-09 | End: 2019-04-10 | Stop reason: HOSPADM

## 2019-04-09 RX ORDER — ONDANSETRON 2 MG/ML
4 INJECTION INTRAMUSCULAR; INTRAVENOUS EVERY 4 HOURS PRN
Status: DISCONTINUED | OUTPATIENT
Start: 2019-04-09 | End: 2019-04-10 | Stop reason: HOSPADM

## 2019-04-09 RX ORDER — TRANEXAMIC ACID 100 MG/ML
INJECTION, SOLUTION INTRAVENOUS PRN
Status: DISCONTINUED | OUTPATIENT
Start: 2019-04-09 | End: 2019-04-09 | Stop reason: SURG

## 2019-04-09 RX ORDER — DIPHENHYDRAMINE HYDROCHLORIDE 50 MG/ML
12.5 INJECTION INTRAMUSCULAR; INTRAVENOUS
Status: DISCONTINUED | OUTPATIENT
Start: 2019-04-09 | End: 2019-04-09 | Stop reason: HOSPADM

## 2019-04-09 RX ORDER — BUPIVACAINE HYDROCHLORIDE AND EPINEPHRINE 5; 5 MG/ML; UG/ML
INJECTION, SOLUTION EPIDURAL; INTRACAUDAL; PERINEURAL
Status: DISCONTINUED | OUTPATIENT
Start: 2019-04-09 | End: 2019-04-09 | Stop reason: HOSPADM

## 2019-04-09 RX ORDER — CLONAZEPAM 1 MG/1
1 TABLET ORAL 3 TIMES DAILY
Status: DISCONTINUED | OUTPATIENT
Start: 2019-04-09 | End: 2019-04-09

## 2019-04-09 RX ORDER — CELECOXIB 200 MG/1
200 CAPSULE ORAL 2 TIMES DAILY
Status: DISCONTINUED | OUTPATIENT
Start: 2019-04-10 | End: 2019-04-10 | Stop reason: HOSPADM

## 2019-04-09 RX ORDER — HYDROCHLOROTHIAZIDE 12.5 MG/1
12.5 TABLET ORAL DAILY
Status: DISCONTINUED | OUTPATIENT
Start: 2019-04-10 | End: 2019-04-10 | Stop reason: HOSPADM

## 2019-04-09 RX ORDER — GABAPENTIN 400 MG/1
800 CAPSULE ORAL 3 TIMES DAILY
Status: DISCONTINUED | OUTPATIENT
Start: 2019-04-09 | End: 2019-04-10 | Stop reason: HOSPADM

## 2019-04-09 RX ORDER — CLONAZEPAM 1 MG/1
1 TABLET ORAL DAILY
Status: DISCONTINUED | OUTPATIENT
Start: 2019-04-10 | End: 2019-04-10 | Stop reason: HOSPADM

## 2019-04-09 RX ORDER — OXYCODONE HCL 5 MG/5 ML
5 SOLUTION, ORAL ORAL
Status: DISCONTINUED | OUTPATIENT
Start: 2019-04-09 | End: 2019-04-09 | Stop reason: HOSPADM

## 2019-04-09 RX ADMIN — SODIUM CHLORIDE, POTASSIUM CHLORIDE, SODIUM LACTATE AND CALCIUM CHLORIDE: 600; 310; 30; 20 INJECTION, SOLUTION INTRAVENOUS at 13:00

## 2019-04-09 RX ADMIN — FENTANYL CITRATE 100 MCG: 50 INJECTION, SOLUTION INTRAMUSCULAR; INTRAVENOUS at 13:06

## 2019-04-09 RX ADMIN — ACETAMINOPHEN 1000 MG: 500 TABLET ORAL at 18:57

## 2019-04-09 RX ADMIN — ACETAMINOPHEN 1000 MG: 500 TABLET ORAL at 23:27

## 2019-04-09 RX ADMIN — ROCURONIUM BROMIDE 10 MG: 10 INJECTION, SOLUTION INTRAVENOUS at 13:41

## 2019-04-09 RX ADMIN — KETOROLAC TROMETHAMINE 30 MG: 30 INJECTION, SOLUTION INTRAMUSCULAR at 18:56

## 2019-04-09 RX ADMIN — SENNOSIDES,DOCUSATE SODIUM 1 TABLET: 8.6; 5 TABLET, FILM COATED ORAL at 21:58

## 2019-04-09 RX ADMIN — ONDANSETRON 4 MG: 2 INJECTION INTRAMUSCULAR; INTRAVENOUS at 19:03

## 2019-04-09 RX ADMIN — PROPOFOL 150 MG: 10 INJECTION, EMULSION INTRAVENOUS at 13:06

## 2019-04-09 RX ADMIN — SODIUM CHLORIDE, SODIUM LACTATE, POTASSIUM CHLORIDE, CALCIUM CHLORIDE: 600; 310; 30; 20 INJECTION, SOLUTION INTRAVENOUS at 12:27

## 2019-04-09 RX ADMIN — EPHEDRINE SULFATE 10 MG: 50 INJECTION INTRAMUSCULAR; INTRAVENOUS; SUBCUTANEOUS at 13:41

## 2019-04-09 RX ADMIN — TRANEXAMIC ACID 1 G: 100 INJECTION, SOLUTION INTRAVENOUS at 13:19

## 2019-04-09 RX ADMIN — CLONAZEPAM 2 MG: 1 TABLET ORAL at 21:57

## 2019-04-09 RX ADMIN — GABAPENTIN 800 MG: 400 CAPSULE ORAL at 21:56

## 2019-04-09 RX ADMIN — Medication 0.5 ML: at 12:26

## 2019-04-09 RX ADMIN — SUGAMMADEX 200 MG: 100 INJECTION, SOLUTION INTRAVENOUS at 15:04

## 2019-04-09 RX ADMIN — GABAPENTIN 300 MG: 300 CAPSULE ORAL at 12:26

## 2019-04-09 RX ADMIN — CEFAZOLIN SODIUM 2 G: 2 INJECTION, SOLUTION INTRAVENOUS at 21:56

## 2019-04-09 RX ADMIN — OXYCODONE HYDROCHLORIDE 10 MG: 10 TABLET ORAL at 19:04

## 2019-04-09 RX ADMIN — LIDOCAINE HYDROCHLORIDE 100 MG: 20 INJECTION, SOLUTION INTRAVENOUS at 13:06

## 2019-04-09 RX ADMIN — INSULIN GLARGINE 24 UNITS: 100 INJECTION, SOLUTION SUBCUTANEOUS at 21:52

## 2019-04-09 RX ADMIN — LIDOCAINE HYDROCHLORIDE 0.5 ML: 10 INJECTION, SOLUTION INFILTRATION; PERINEURAL at 12:26

## 2019-04-09 RX ADMIN — DOCUSATE SODIUM 100 MG: 100 CAPSULE, LIQUID FILLED ORAL at 18:56

## 2019-04-09 RX ADMIN — OXYCODONE HYDROCHLORIDE 10 MG: 10 TABLET ORAL at 23:27

## 2019-04-09 RX ADMIN — ONDANSETRON 4 MG: 2 INJECTION INTRAMUSCULAR; INTRAVENOUS at 14:47

## 2019-04-09 RX ADMIN — KETOROLAC TROMETHAMINE 30 MG: 30 INJECTION, SOLUTION INTRAMUSCULAR at 23:27

## 2019-04-09 RX ADMIN — CEFAZOLIN 2 G: 330 INJECTION, POWDER, FOR SOLUTION INTRAMUSCULAR; INTRAVENOUS at 13:17

## 2019-04-09 RX ADMIN — EPHEDRINE SULFATE 10 MG: 50 INJECTION INTRAMUSCULAR; INTRAVENOUS; SUBCUTANEOUS at 13:22

## 2019-04-09 RX ADMIN — FENTANYL CITRATE 50 MCG: 50 INJECTION, SOLUTION INTRAMUSCULAR; INTRAVENOUS at 14:20

## 2019-04-09 RX ADMIN — ROCURONIUM BROMIDE 40 MG: 10 INJECTION, SOLUTION INTRAVENOUS at 13:06

## 2019-04-09 RX ADMIN — CELECOXIB 200 MG: 200 CAPSULE ORAL at 12:26

## 2019-04-09 RX ADMIN — ACETAMINOPHEN 1000 MG: 500 TABLET, FILM COATED ORAL at 12:26

## 2019-04-09 RX ADMIN — FENTANYL CITRATE 50 MCG: 50 INJECTION, SOLUTION INTRAMUSCULAR; INTRAVENOUS at 15:05

## 2019-04-09 RX ADMIN — VANCOMYCIN HYDROCHLORIDE 1500 MG: 100 INJECTION, POWDER, LYOPHILIZED, FOR SOLUTION INTRAVENOUS at 12:49

## 2019-04-09 RX ADMIN — MUPIROCIN 2 %: 20 OINTMENT TOPICAL at 19:04

## 2019-04-09 RX ADMIN — ROCURONIUM BROMIDE 10 MG: 10 INJECTION, SOLUTION INTRAVENOUS at 14:20

## 2019-04-09 ASSESSMENT — COGNITIVE AND FUNCTIONAL STATUS - GENERAL
SUGGESTED CMS G CODE MODIFIER MOBILITY: CK
MOBILITY SCORE: 18
TOILETING: A LITTLE
TURNING FROM BACK TO SIDE WHILE IN FLAT BAD: A LITTLE
SUGGESTED CMS G CODE MODIFIER DAILY ACTIVITY: CJ
DRESSING REGULAR LOWER BODY CLOTHING: A LITTLE
CLIMB 3 TO 5 STEPS WITH RAILING: A LITTLE
MOVING FROM LYING ON BACK TO SITTING ON SIDE OF FLAT BED: A LITTLE
STANDING UP FROM CHAIR USING ARMS: A LITTLE
DAILY ACTIVITIY SCORE: 20
MOVING TO AND FROM BED TO CHAIR: A LITTLE
HELP NEEDED FOR BATHING: A LITTLE
WALKING IN HOSPITAL ROOM: A LITTLE
DRESSING REGULAR UPPER BODY CLOTHING: A LITTLE

## 2019-04-09 ASSESSMENT — LIFESTYLE VARIABLES
EVER_SMOKED: NEVER
ALCOHOL_USE: NO

## 2019-04-09 ASSESSMENT — PATIENT HEALTH QUESTIONNAIRE - PHQ9
2. FEELING DOWN, DEPRESSED, IRRITABLE, OR HOPELESS: NOT AT ALL
SUM OF ALL RESPONSES TO PHQ9 QUESTIONS 1 AND 2: 0
1. LITTLE INTEREST OR PLEASURE IN DOING THINGS: NOT AT ALL

## 2019-04-09 ASSESSMENT — PAIN SCALES - GENERAL: PAIN_LEVEL: 0

## 2019-04-09 NOTE — ANESTHESIA QCDR
2019 [facility] Clinical Data Registry (for Quality Improvement)     Postoperative nausea/vomiting risk protocol (Adult = 18 yrs and Pediatric 3-17 yrs)- (430 and 463)  General inhalation anesthetic (NOT TIVA) with PONV risk factors: No  Provision of anti-emetic therapy with at least 2 different classes of agents: N/A  Patient DID NOT receive anti-emetic therapy and reason is documented in Medical Record: N/A    Multimodal Pain Management- (AQI59)  Patient undergoing Elective Surgery (i.e. Outpatient, or ASC, or Prescheduled Surgery prior to Hospital Admission): Yes  Use of Multimodal Pain Management, two or more drugs and/or interventions, NOT including systemic opioids: Yes   Exception: Documented allergy to multiple classes of analgesics:  N/A    PACU assessment of acute postoperative pain prior to Anesthesia Care End- Applies to Patients Age = 18- (ABG7)  Initial PACU pain score is which of the following: < 7/10  Patient unable to report pain score: N/A    Post-anesthetic transfer of care checklist/protocol to PACU/ICU- (426 and 427)  Upon conclusion of case, patient transferred to which of the following locations: PACU/Non-ICU  Use of transfer checklist/protocol: Yes  Exclusion: Service Performed in Patient Hospital Room (and thus did not require transfer): N/A    PACU Reintubation- (AQI31)  General anesthesia requiring endotracheal intubation (ETT) along with subsequent extubation in OR or PACU: Yes  Required reintubation in the PACU: No   Extubation was a planned trial documented in the medical record prior to removal of the original airway device:  N/A    Unplanned admission to ICU related to anesthesia service up through end of PACU care- (MD51)  Unplanned admission to ICU (not initially anticipated at anesthesia start time): No

## 2019-04-09 NOTE — ANESTHESIA POSTPROCEDURE EVALUATION
Patient: Michael Caceres    Procedure Summary     Date:  04/09/19 Room / Location:  [facility] 14 / SURGERY [facility]    Anesthesia Start:  1300 Anesthesia Stop:  1519    Procedure:  REVISION, TOTAL ARTHROPLASTY, HIP-  AND OTHER INDICATED PROCEDURES (Right Hip) Diagnosis:  (Dislocation/fracture right polyethylene liner of total hip arthroplasty)    Surgeon:  Shawn Chaudhari M.D. Responsible Provider:  Gordo Viera M.D.    Anesthesia Type:  general ASA Status:  2          Final Anesthesia Type: general  Last vitals  BP   Blood Pressure: 135/58, NIBP: 131/61    Temp   36.4 °C (97.6 °F)    Pulse   Pulse: 82, Heart Rate (Monitored): 82   Resp   (!) 10    SpO2   100 %      Anesthesia Post Evaluation    Patient location during evaluation: PACU  Patient participation: complete - patient participated  Level of consciousness: awake and alert  Pain score: 0    Airway patency: patent  Anesthetic complications: no  Cardiovascular status: hemodynamically stable  Respiratory status: acceptable  Hydration status: euvolemic    PONV: none           Nurse Pain Score: 0 (NPRS)

## 2019-04-09 NOTE — OP REPORT
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    PROCEDURE PERFORMED:  Revision right total hip arthroplasty involving the   acetabular liner and femoral head components.    SURGEON:  Shawn Chaudhari MD    ANESTHESIOLOGIST:  Gordo Viera MD    ANESTHESIA:  General.    ESTIMATED BLOOD LOSS:  200 mL    ASSISTANT:  Orlando Ruiz PA-C    NEW IMPLANTS:  1.  DePuy +4, 10-degree faced 64 mm outer diameter x 40 mm inner diameter   polyethylene acetabular liner.  2.  Biolox delta revision 40 mm ceramic head, +12 mm.    INDICATION FOR PROCEDURE:  The patient is a 63-year-old male in 11/2017 I   performed a right total hip arthroplasty for end-stage arthritis.  He had been   doing very well until just a few days ago when he was at the gym.  He jumped   off treadmill and bent over and tied his shoe and felt a pop.  He was able to   run on the treadmill for about 30 more minutes and later that day he had pain   to the degree where he presented to the emergency department due to persistent   popping.  He was found to have evidence of polyethylene failure without   obvious dislocation on x-rays and subsequently followed up with me on an   outpatient basis a couple of days later.  I evaluated it and repeated x-rays   and there was suspicion for failure of his polyethylene insert given the   eccentric location of his femoral head within the acetabular shell.  I   discussed these findings with the patient and recommended revision for the   very least head and liner exchange and other indicated procedures.  He signed   informed consent preoperatively and he wished to proceed as outlined above.    DESCRIPTION OF PROCEDURE:  The patient was met in the preop holding area.  His   surgical site was signed.  His consent  was confirmed for accuracy.  He was   taken back to the operating room and general anesthesia was induced.  Ancef   and vancomycin were administered.  Tranexamic acid was also administered.  He   was positioned in the left lateral decubitus position with Stulberg   positioners and an axillary roll.  The right lower extremity was prepped and   draped in the usual sterile fashion.  A formal timeout was performed to   confirm patient's correct name, correct surgical site, correct procedure and   correct laterality.  As a posterior approach, incision was reincised with a   scalpel down through skin.  Dissection was carried with Bovie cautery down   through subcutaneous tissue to the iliotibial band and the gluteal fascia,   which were split longitudinally.  I mobilized the iliotibial band and gluteus   mago off of the gluteus medius and there was a traumatic plane extending   down to the hip joint and had been some disruption of the Tycron sutures.  I   released soft tissue directly off the posterolateral aspect of the femur and   using the traumatic plane released a portion of the posterior capsule exposing   the posterior rim of the acetabular component.  I dislocated the ceramic   head, which had some wear and was articulating with the acetabular shell and   took some time to expose the acetabular shell resecting scar tissue and   anterior capsule off of the anterior aspect to expose the acetabular rim   anteriorly and superiorly as well as inferiorly.  The polyethylene liner had   been dislodged and was rotated inferiorly.  I was able to remove the   polyethylene liner, which was distorted and deformed to some degree.  There   was a small tab off of the polyethylene liner that was identified and had   apparently broken off.  Once I removed the polyethylene liner and the femoral   head component, I then mobilized the anterior and superior capsule in order to   retract the femoral stem in the femur out of the way  to expose the acetabulum   and thoroughly irrigated out the acetabular shell.  It was well fixed to the   acetabulum and I felt that the cup position was acceptable and did not require   revision of the shell.  There were just some small superficial scratches in the acetabular   shell.  The [place] taper of the femoral stem was without evidence of corrosion   or significant scratches.  I then inserted the trial of 40x36 mm neutral   acetabular liner and inserted into place with a +12 mm head length.  I felt   that he could benefit from some improved stability based on his stability   parameters while trialing as he was dislocating posteriorly inferiorly at about 30   degrees of internal rotation.  I therefore selected and inserted a +4, 10-degree faced   liner and after thoroughly irrigating out the acetabular shell and impacted   into place, confirming that it was well seated with all the visible tabs, at   least 4 were identified, I then inserted a 40 mm x +12 mm Biolox delta   revision ceramic femoral head and cleansed and dried the [place] taper, impacted   into place and reduced the hip and thoroughly irrigated the wound once again.    There was no posterior impingement on the implant femoral neck with extension  and external rotation and no anterior instability noted with this maneuver.     I soaked the wound in dilute Betadine solution for 3 minutes and then   repaired the posterior capsule and posterior soft tissues including the   external rotators with #5 Tycron through the greater trochanter.  The last   attempt to pass the needle through the greater trochanter, I broke off just at   the very tip of the needle, but it was buried well within the greater   trochanter and I felt it would cause more harm to try to remove it and this   was felt it was in his best interest in the safe to leave in place.  I then   repaired the short external rotators and the posterior capsule, but securely   tying down the Tycron  suture.  I then reapproximated the IT band with size 2   Stratafix suture, subQ layers with 0 Vicryl, 2-0 Vicryl, and the skin edges   with staples and applied a sterile silver impregnated Mepilex dressing.  He   was then transferred in the supine position, placed into a hip abduction   pillow, transferred on the gurney, awoken from anesthesia and transferred to   the postanesthesia care unit in stable condition.    Marcelo Ruiz PA-C, was present for the duration of the procedure and   assisted with patient positioning, retracting and wound closure.    PLAN:  1.  The patient will be admitted postop.  2.  He can be weightbearing as tolerated to the right lower extremity, but   should maintain posterior hip precautions at all times.  3.  I will continue Ancef and vancomycin postop for 24 hours.  4.  He should work with physical and occupational therapy as soon as possible   for mobilization.  5.  He will be started on Lovenox tomorrow and continue SCDs for DVT   prophylaxis while in the hospital.       ____________________________________     MD CHAN Robles / EVGENY    DD:  04/09/2019 15:43:14  DT:  04/09/2019 16:03:15    D#:  [id number]  Job#:  [id number]

## 2019-04-09 NOTE — ANESTHESIA PREPROCEDURE EVALUATION
Relevant Problems   (+) Diabetes mellitus type 2 in obese (HCC)   (+) GERD (gastroesophageal reflux disease)   (+) Hypertension due to endocrine disorder       Physical Exam    Airway   Mallampati: II  TM distance: >3 FB  Neck ROM: full       Cardiovascular - normal exam  Rhythm: regular  Rate: normal  (-) murmur     Dental - normal exam  (+) upper dentures         Pulmonary - normal exam  Breath sounds clear to auscultation     Abdominal    Neurological - normal exam                 Anesthesia Plan    ASA 2       Plan - general       Airway plan will be ETT        Induction: intravenous          Informed Consent:    Anesthetic plan and risks discussed with patient.    Use of blood products discussed with: patient whom.

## 2019-04-09 NOTE — OR SURGEON
Immediate Post OP Note    PreOp Diagnosis: Acute failure of right total hip arthroplasty polyethylene liner    PostOp Diagnosis: same    Procedure(s):  REVISION, TOTAL ARTHROPLASTY, HIP-  AND OTHER INDICATED PROCEDURES - Wound Class: Clean    Surgeon(s):  Shawn Chaudhari M.D.    Anesthesiologist/Type of Anesthesia:  Anesthesiologist: Gordo Viera M.D./General    Surgical Staff:  Circulator: Jennifer Ruano R.N.; Brittnee Martins R.N.  Scrub Person: London Turk  First Assist: Orlando Ruiz P.A.-C.    Specimens removed if any:  * No specimens in log *    Estimated Blood Loss: 200cc    Findings: see dictation    Complications: none known    PLAN:  --admit postop  --WBAT RLE  --posterior hip precautions  --ancef/vanc x 24hrs postop (hx of MRSA infection to abdomen)  --mupirocin x 5 days postop  --PT/OT for mobilization  --anticipate discharge to home tomorrow if doing well  --fu 2 weeks postop        4/9/2019 4:01 PM Shawn Chaudhari M.D.

## 2019-04-09 NOTE — PROGRESS NOTES
63yoM with acute failure of right total hip arthroplasty s/p revision RACHEL (head/liner exchange) today.    S: Comfortable postop in PACU, awake and alert    O:  Vitals:    04/09/19 1545   BP:    Pulse: 79   Resp: (!) 21   Temp:    SpO2: 94%     Exam:  General-NAD, alert and oriented, following commands  RLE-hip dressing c/d/i, abduction pillow in place, +EHL/FHL/TA/GS motor, SILT in foot with palp dp pulse    A: 63yoM with acute failure of right total hip arthroplasty s/p revision RACHEL (head/liner exchange) today.  Postop xrays look good.    Recs:  --admit postop  --WBAT RLE  --posterior hip precautions  --ancef/vanc x 24hrs postop (hx of MRSA infection to abdomen)  --mupirocin x 5 days postop  --PT/OT for mobilization  --anticipate discharge to home tomorrow if doing well  --fu 2 weeks postop  --I informed Mr. Caceres that during surgery the tip of a ticron suture broke of in his greater trochanter while repairing the joint capsule and external rotators.  I let him know that I did not feel it would be in his best interest to remove this small part of the needle as it would require removing bone and there would really be no benefit.  He expressed understanding and comfort with this.

## 2019-04-09 NOTE — ANESTHESIA TIME REPORT
Anesthesia Start and Stop Event Times     Date Time Event    4/9/2019 1300 Anesthesia Start     1519 Anesthesia Stop        Responsible Staff  04/09/19    Name Role Begin End    Gordo Viera M.D. Anesth 1300 1519        Preop Diagnosis (Free Text):  Pre-op Diagnosis     Dislocation/fracture right polyethylene liner of total hip arthroplasty        Preop Diagnosis (Codes):  Diagnosis Information     Diagnosis Code(s):         Post op Diagnosis  Closed right hip fracture (HCC)      Premium Reason  Non-Premium    Comments:

## 2019-04-09 NOTE — ANESTHESIA TIME REPORT
Anesthesia Start and Stop Event Times     Date Time Event    4/9/2019 1300 Anesthesia Start     1519 Anesthesia Stop        Responsible Staff  04/09/19    Name Role Begin End    Gordo Viera M.D. Anesth 1300 1519        Preop Diagnosis (Free Text):  Pre-op Diagnosis     Dislocation/fracture right polyethylene liner of total hip arthroplasty        Preop Diagnosis (Codes):  Diagnosis Information     Diagnosis Code(s):         Post op Diagnosis  Closed right hip fracture (HCC)      Premium Reason  A. 3PM - 7AM    Comments:

## 2019-04-09 NOTE — ANESTHESIA PROCEDURE NOTES
Airway  Date/Time: 4/9/2019 1:07 PM  Performed by: MATI NAIK  Authorized by: MATI NAIK     Location:  OR  Urgency:  Elective  Indications for Airway Management:  Anesthesia  Spontaneous Ventilation: absent    Sedation Level:  Deep  Preoxygenated: Yes    Patient Position:  Sniffing  Final Airway Type:  Endotracheal airway  Final Endotracheal Airway:  ETT  Cuffed: Yes    Technique Used for Successful ETT Placement:  Direct laryngoscopy  Insertion Site:  Oral  Blade Type:  Kristy  Laryngoscope Blade/Videolaryngoscope Blade Size:  3  ETT Size (mm):  8.0  Measured from:  Teeth  ETT to Teeth (cm):  22  Placement Verified by: auscultation and capnometry    Cormack-Lehane Classification:  Grade I - full view of glottis  Number of Attempts at Approach:  1

## 2019-04-10 VITALS
HEIGHT: 72 IN | TEMPERATURE: 97.7 F | SYSTOLIC BLOOD PRESSURE: 120 MMHG | OXYGEN SATURATION: 97 % | HEART RATE: 73 BPM | RESPIRATION RATE: 16 BRPM | BODY MASS INDEX: 29.05 KG/M2 | DIASTOLIC BLOOD PRESSURE: 73 MMHG | WEIGHT: 214.51 LBS

## 2019-04-10 LAB
ANION GAP SERPL CALC-SCNC: 4 MMOL/L (ref 0–11.9)
BUN SERPL-MCNC: 8 MG/DL (ref 8–22)
CALCIUM SERPL-MCNC: 8.2 MG/DL (ref 8.5–10.5)
CHLORIDE SERPL-SCNC: 109 MMOL/L (ref 96–112)
CO2 SERPL-SCNC: 27 MMOL/L (ref 20–33)
CREAT SERPL-MCNC: 0.84 MG/DL (ref 0.5–1.4)
ERYTHROCYTE [DISTWIDTH] IN BLOOD BY AUTOMATED COUNT: 53.9 FL (ref 35.9–50)
GLUCOSE BLD-MCNC: 124 MG/DL (ref 65–99)
GLUCOSE BLD-MCNC: 92 MG/DL (ref 65–99)
GLUCOSE SERPL-MCNC: 75 MG/DL (ref 65–99)
HCT VFR BLD AUTO: 39.6 % (ref 42–52)
HGB BLD-MCNC: 13.3 G/DL (ref 14–18)
MCH RBC QN AUTO: 30 PG (ref 27–33)
MCHC RBC AUTO-ENTMCNC: 33.6 G/DL (ref 33.7–35.3)
MCV RBC AUTO: 89.4 FL (ref 81.4–97.8)
PLATELET # BLD AUTO: 189 K/UL (ref 164–446)
PMV BLD AUTO: 9.6 FL (ref 9–12.9)
POTASSIUM SERPL-SCNC: 3.1 MMOL/L (ref 3.6–5.5)
RBC # BLD AUTO: 4.43 M/UL (ref 4.7–6.1)
SODIUM SERPL-SCNC: 140 MMOL/L (ref 135–145)
WBC # BLD AUTO: 8.1 K/UL (ref 4.8–10.8)

## 2019-04-10 PROCEDURE — 36415 COLL VENOUS BLD VENIPUNCTURE: CPT

## 2019-04-10 PROCEDURE — 700102 HCHG RX REV CODE 250 W/ 637 OVERRIDE(OP): Performed by: ORTHOPAEDIC SURGERY

## 2019-04-10 PROCEDURE — 700111 HCHG RX REV CODE 636 W/ 250 OVERRIDE (IP): Performed by: ORTHOPAEDIC SURGERY

## 2019-04-10 PROCEDURE — A9270 NON-COVERED ITEM OR SERVICE: HCPCS | Performed by: ORTHOPAEDIC SURGERY

## 2019-04-10 PROCEDURE — 700112 HCHG RX REV CODE 229: Performed by: ORTHOPAEDIC SURGERY

## 2019-04-10 PROCEDURE — 700105 HCHG RX REV CODE 258

## 2019-04-10 PROCEDURE — 80048 BASIC METABOLIC PNL TOTAL CA: CPT

## 2019-04-10 PROCEDURE — 97161 PT EVAL LOW COMPLEX 20 MIN: CPT

## 2019-04-10 PROCEDURE — 700105 HCHG RX REV CODE 258: Performed by: ORTHOPAEDIC SURGERY

## 2019-04-10 PROCEDURE — 97165 OT EVAL LOW COMPLEX 30 MIN: CPT

## 2019-04-10 PROCEDURE — 85027 COMPLETE CBC AUTOMATED: CPT

## 2019-04-10 PROCEDURE — 82962 GLUCOSE BLOOD TEST: CPT | Mod: 91

## 2019-04-10 RX ORDER — OXYCODONE HYDROCHLORIDE 10 MG/1
10 TABLET ORAL
Qty: 80 TAB | Refills: 0 | Status: SHIPPED | OUTPATIENT
Start: 2019-04-10 | End: 2019-04-25

## 2019-04-10 RX ORDER — ASPIRIN 325 MG
325 TABLET ORAL 2 TIMES DAILY
Qty: 28 TAB | Refills: 0 | Status: SHIPPED | OUTPATIENT
Start: 2019-04-10 | End: 2019-04-24

## 2019-04-10 RX ORDER — DOCUSATE SODIUM 100 MG/1
100 CAPSULE, LIQUID FILLED ORAL 2 TIMES DAILY
Qty: 60 CAP | Refills: 0 | Status: SHIPPED | OUTPATIENT
Start: 2019-04-10

## 2019-04-10 RX ADMIN — SITAGLIPTIN 50 MG: 50 TABLET, FILM COATED ORAL at 07:26

## 2019-04-10 RX ADMIN — VANCOMYCIN HYDROCHLORIDE 1500 MG: 100 INJECTION, POWDER, LYOPHILIZED, FOR SOLUTION INTRAVENOUS at 02:10

## 2019-04-10 RX ADMIN — CLONAZEPAM 1 MG: 1 TABLET ORAL at 06:36

## 2019-04-10 RX ADMIN — ACETAMINOPHEN 1000 MG: 500 TABLET ORAL at 06:36

## 2019-04-10 RX ADMIN — GABAPENTIN 800 MG: 400 CAPSULE ORAL at 11:02

## 2019-04-10 RX ADMIN — OXYCODONE HYDROCHLORIDE 10 MG: 10 TABLET ORAL at 14:32

## 2019-04-10 RX ADMIN — SODIUM CHLORIDE 750 ML: 9 INJECTION, SOLUTION INTRAVENOUS at 02:09

## 2019-04-10 RX ADMIN — CHOLECALCIFEROL TAB 10 MCG (400 UNIT) 400 UNITS: 10 TAB at 06:35

## 2019-04-10 RX ADMIN — OXYCODONE HYDROCHLORIDE 10 MG: 10 TABLET ORAL at 11:03

## 2019-04-10 RX ADMIN — KETOROLAC TROMETHAMINE 30 MG: 30 INJECTION, SOLUTION INTRAMUSCULAR at 11:03

## 2019-04-10 RX ADMIN — KETOROLAC TROMETHAMINE 30 MG: 30 INJECTION, SOLUTION INTRAMUSCULAR at 06:36

## 2019-04-10 RX ADMIN — VERAPAMIL HYDROCHLORIDE 240 MG: 240 TABLET, FILM COATED, EXTENDED RELEASE ORAL at 07:26

## 2019-04-10 RX ADMIN — GABAPENTIN 800 MG: 400 CAPSULE ORAL at 06:36

## 2019-04-10 RX ADMIN — OXYCODONE HYDROCHLORIDE 10 MG: 10 TABLET ORAL at 07:27

## 2019-04-10 RX ADMIN — MUPIROCIN 2 %: 20 OINTMENT TOPICAL at 06:35

## 2019-04-10 RX ADMIN — ENOXAPARIN SODIUM 40 MG: 100 INJECTION SUBCUTANEOUS at 06:35

## 2019-04-10 RX ADMIN — DOCUSATE SODIUM 100 MG: 100 CAPSULE, LIQUID FILLED ORAL at 06:36

## 2019-04-10 RX ADMIN — OXYCODONE HYDROCHLORIDE 10 MG: 10 TABLET ORAL at 03:39

## 2019-04-10 RX ADMIN — DULOXETINE HYDROCHLORIDE 90 MG: 60 CAPSULE, DELAYED RELEASE ORAL at 06:36

## 2019-04-10 RX ADMIN — CEFAZOLIN SODIUM 2 G: 2 INJECTION, SOLUTION INTRAVENOUS at 05:00

## 2019-04-10 RX ADMIN — METFORMIN HYDROCHLORIDE 1000 MG: 500 TABLET, FILM COATED ORAL at 07:26

## 2019-04-10 ASSESSMENT — GAIT ASSESSMENTS
ASSISTIVE DEVICE: FRONT WHEEL WALKER
DISTANCE (FEET): 200
DEVIATION: BRADYKINETIC
GAIT LEVEL OF ASSIST: SUPERVISED

## 2019-04-10 ASSESSMENT — COGNITIVE AND FUNCTIONAL STATUS - GENERAL
DRESSING REGULAR LOWER BODY CLOTHING: A LITTLE
CLIMB 3 TO 5 STEPS WITH RAILING: A LITTLE
DAILY ACTIVITIY SCORE: 23
SUGGESTED CMS G CODE MODIFIER DAILY ACTIVITY: CI
SUGGESTED CMS G CODE MODIFIER MOBILITY: CI
MOBILITY SCORE: 23

## 2019-04-10 ASSESSMENT — ACTIVITIES OF DAILY LIVING (ADL): TOILETING: INDEPENDENT

## 2019-04-10 NOTE — THERAPY
"Pt is a 64 y/o male presenting to physical therapy s/p R RACHEL revision, WBAT with posterior precautions. Pt reports inital RACHEL was approx 1 year ago. Demonstrated slight gait deviations due to post op pain. Educated pt on posterior precautions, exercises, goals for ambulation upon DC home, car transfers and stair safety. Pt verbalized understanding and demonstrated safe stair negotiation. Pt has FWW upon DC home. No further acute PT needs.     Physical Therapy Evaluation completed.   Bed Mobility:  Supine to Sit: Supervised  Transfers: Sit to Stand: Supervised  Gait: Level Of Assist: Supervised with Front-Wheel Walker       Plan of Care: Patient with no further skilled PT needs in the acute care setting at this time, demonstrates safety with mobility in this environment at this time.   Discharge Recommendations: Equipment: No Equipment Needed. Post-acute therapy: Recommend home and outpatient PT follow up to optimize return to desired level of function    See \"Rehab Therapy-Acute\" Patient Summary Report for complete documentation.     "

## 2019-04-10 NOTE — THERAPY
"Occupational Therapy Evaluation completed.   Functional Status:  Min/SBA with ADLs and txfs, wife can assist with LB dressing as needed.  Plan of Care: Patient with no further skilled OT needs in the acute care setting at this time  Discharge Recommendations:   Post-acute therapy Currently anticipate no further skilled therapy needs once patient is discharged from the inpatient setting.    See \"Rehab Therapy-Acute\" Patient Summary Report for complete documentation.    "

## 2019-04-10 NOTE — PROGRESS NOTES
Patient discharged home per active order. Prescriptions given to patient, signed consent in chart. Discharge instructions reviewed with patient, signed copy in chart. Patient verbalized understanding of orders and all questions addressed. PIV removed, catheter intact. Patient has FWW for home. Patient escorted off the unit via wheelchair with all personal belongings accompanied by his wife and RN.

## 2019-04-10 NOTE — DISCHARGE INSTRUCTIONS
Discharge Instructions    Discharged to home by car with relative. Discharged via wheelchair, hospital escort: Yes.  Special equipment needed: Not Applicable    Be sure to schedule a follow-up appointment with your primary care doctor or any specialists as instructed.     Discharge Plan:   Diet Plan: Discussed  Activity Level: Discussed  Confirmed Follow up Appointment: Patient to Call and Schedule Appointment  Confirmed Symptoms Management: Discussed  Medication Reconciliation Updated: Yes  Influenza Vaccine Indication: Not indicated: Previously immunized this influenza season and > 8 years of age    I understand that a diet low in cholesterol, fat, and sodium is recommended for good health. Unless I have been given specific instructions below for another diet, I accept this instruction as my diet prescription.   Other diet: Diabetic diet    Special Instructions: Discharge instructions for the Orthopedic Patient    Follow up with Primary Care Physician within 2 weeks of discharge to home, regarding:  Review of medications and diagnostic testing.  Surveillance for medical complications.  Workup and treatment of osteoporosis, if appropriate.     -Is this a Joint Replacement patient? Yes   Total Joint Hip Replacement Discharge Instructions    Pain  - The goal is to slowly wean off the prescription pain medicine.  - Ice can be used for pain control.  20 minutes at a time is recommended, and never directly against your skin or incision.  - Most patients are off the pain pills by 3 weeks; others may require a low level of pain medications for many months. If your pain continues to be severe, follow up with your physician.  Infection  Deep hip joint infections that require removal of the prostheses occur in less than 0.1% of patients. Lesser infections in the skin (cellulites) are more common and much more easily treated.  - Keep the incision as clean and dry as possible.  - Always wash your hands before touching your  incision.  - Skin infections tend to develop around 7-10 days after surgery, most can be treated with oral antibiotics.  - Dental Care should be delayed for 3 months after surgery, your surgeon recommends taking a dose of antibiotics 1 hour prior to any dental procedure.  After 2 years, most surgeons recommend antibiotics only before an extensive procedure.  Ask your surgeon what he recommends.  - Signs and symptoms of infection can include:  low grade fever, redness, pain, swelling and drainage from your incision.  Notify your surgeon immediately if you develop any of these symptoms.  Post op Disturbances  - Bowel habits - constipation is extremely common and is caused by a combination of anesthesia, lack of mobility and pain medicine.  Use stool softeners or laxatives if necessary. It is important not to ignore this problem, as bowel obstructions can be a serious complication after joint replacement surgery.  - Mood/Energy Level - Many patients experience a lack of energy and endurance for up to 2-3 months after surgery.  Some may also feel down and can even become depressed.  This is likely due to the postoperative anemia, change in activity level, lack of sleep, pain medicine and just the emotional reaction to the surgery itself that is a big disruption in a person’s life.  This usually passes.  If symptoms persist, follow up with your primary physician.  - Returning to work - Your surgeon will give you more specific instructions.  Generally, if you work a sedentary job requiring little standing or walking, most patients may return within 2-6 weeks.  Manual labor jobs involving walking, lifting and standing may take 3-4 months.  Your surgeon’s office can provide a release to part-time or light duty work early on in your recovery and progress you to full duty as able.  - Driving - You can begin driving an automatic shift car in 4 to 8 weeks, provided you are no longer taking narcotic pain medication. If you have  a stick-shift car and your right hip was replaced, do not begin driving until your doctor says you can.   - Avoiding falls -  throw rugs and tack down loose carpeting.  Be aware of floor hazards such as pets, small objects or uneven surfaces.   -  Airport Metal Detectors - The sensitivity of metal detectors varies and it is likely that your prosthesis will cause an alarm. Inform the  that you have an artificial joint.  Diet  - Resume your normal diet as tolerated.  - It is important to achieve a healthy nutritional status by eating a well balanced diet on a regular basis.  - Your physician may recommend that you take iron and vitamin supplements.   - Continue to drink plenty of fluids.  Shower/Bathing  - You may shower as soon as you get home from the hospital unless otherwise instructed.  - Keep your incision out of water.  To keep the incision dry when showering, cover it with a plastic bag or plastic wrap.  - Pat incision dry if it gets wet.  Don’t rub.  - Do not submerge in a bath until staples are out and the incision is completely healed. (Approximately 6-8 weeks after surgery).  Dressing Change:  Procedure (if recommended by your physician)  - Wash hands.  - Open all dressing change materials.  - Remove old dressing and discard.  - Inspect incision for redness, increase in clear drainage, yellow/green drainage, odor and surrounding skin hot to touch.  -  ABD (large gauze) pad by one corner and lay over the incision.  Be careful not to touch the inside of the dressing that will lay over the incision.  - Secure in place as instructed (Ace wrap or tape).    Swelling/Bruising  - Swelling is normal after hip replacement and can involve the thigh, knee, calf and foot.  - Swelling can last from 3-6 months.  - Elevate your leg higher than your heart while reclining.  The first week you are home you should elevate your leg an equal amount of time, as you are active.    - Anti-inflammatory  pills can be taken once you have stopped the blood thinners.  - The swelling is usually worse after you go home since you are upright for longer periods of time.  - Bruising is common and can involve the entire leg including the thigh, calf and even foot.  Bruising often does not appear until after you arrive home and it can be quite dramatic- purple, black, green.  The bruising you can see is not usually concerning and will subside without any treatment.      Blood Clot Prevention  Blood clots in the legs and the less common, but frightening, clots that travel to the lungs are a real focus of our preventative. Most patients are at standard risk for them, but those patients who are at higher risk include people who have had previous clots, a family history of clotting, smoking, diabetes, obesity, advanced age, use of estrogen and a sedentary lifestyle.    - Signs of blood clots in legs - Swelling in thigh, calf or ankle that does not go down with elevation.  Pain, heat and tenderness in calf, back of calf or groin area.  NOTE: blood clots can occur in either leg.  - You have been receiving anticoagulant therapy (blood thinners) in the hospital and you may be instructed to continue at home depending on your risk factors.  - Your risk for developing a clot continues for up to 2-3 months after surgery.  You should avoid prolonged sitting and dehydration during that time (long air trips and car trips).  If you do take a trip during this time, please get up and move around every 1- 1.5 hours.  - If you are prescribed blood thinning medication for home, follow instructions as directed. (Handouts provided if applicable).      Activity    Once you get home, you should stay active. The key is not to overdo it! While you can expect some good days and some bad days, you should notice a gradual improvement over time you should notice a gradual improvement and a gradual increase in your endurance over the next 6 to 12  months.    - Weight Bearing - If you have undergone cemented or hybrid hip replacement, you can put some weight on the leg immediately using a cane or walker, and you should continue to use some support for 4 to 6 weeks to help the muscles recover.   - Sleeping Positions - Sleep on your back with your legs slightly apart or on your side with a regular pillow between your knees. Be sure to use the pillow for at least 6 weeks, or until your doctor says you can do without it. Sleeping on your stomach should be all right  - Sitting - For at least the first 3 months, sit only in chairs that have arms. Do not sit on low chairs, low stools, or reclining chairs. Do not cross your legs at the knees. The physical therapist will show you how to sit and stand from a chair, keeping your affected leg out in front of you. Get up and move around on a regular basis--at least once every hour.  - Walking - Walk as much as you like once your doctor gives you the go-ahead, but remember that walking is no substitute for your prescribed exercises. Walking with a pair of trekking poles is helpful and adds as much as 40% to the exercise you get when you walk  - Therapy may be needed in some cases, to strengthen your muscles and improve your gait (walking pattern).  This decision will be made at your post-operative appointment.  Follow your therapist recommended post-operative exercises (handout provided by Therapist).  - Swimming is also recommended; you can begin as soon as the sutures have been removed and the wound is healed, approximately 6 to 8 weeks after surgery. Using a pair of training fins may make swimming a more enjoyable and effective exercise.  - Other activities - Lower impact activities are preferred.  If you have specific questions, consult your Surgeon.    - Sexual activity - Your surgeon can tell you when it should be safe to resume sexual activity.      When to Call the Doctor   Call the physician if:   - Fever over  100.5? F  - Increased pain, drainage, redness, odor or heat around the incision area  - Shaking chills  - Increased knee pain with activity and rest  - Increased pain in calf, tenderness or redness above or below the knee  - Increased swelling of calf, ankle, foot  - Sudden increased shortness of breath, sudden onset of chest pain, localized chest pain with coughing  - Incision opening  Or, if there are any questions or concerns about medications or care.       -Is this patient being discharged with medication to prevent blood clots?  Yes, Aspirin Aspirin, ASA oral tablets  What is this medicine?  ASPIRIN (AS pir in) is a pain reliever. It is used to treat mild pain and fever. This medicine is also used as directed by a doctor to prevent and to treat heart attacks, to prevent strokes, and to treat arthritis or inflammation.  This medicine may be used for other purposes; ask your health care provider or pharmacist if you have questions.  COMMON BRAND NAME(S): Aspir-Low, Aspir-Samina, Aspirtab, Jonas Advanced Aspirin, Jonas Aspirin, Jonas Aspirin Extra Strength, Jonas Aspirin Plus, Jonas Extra Strength, Jonas Extra Strength Plus, Jonas Genuine Aspirin, Jonas Womens Aspirin, Bufferin, Bufferin Extra Strength, Bufferin Low Dose  What should I tell my health care provider before I take this medicine?  They need to know if you have any of these conditions:  -anemia  -asthma  -bleeding problems  -child with chickenpox, the flu, or other viral infection  -diabetes  -gout  -if you frequently drink alcohol containing drinks  -kidney disease  -liver disease  -low level of vitamin K  -lupus  -smoke tobacco  -stomach ulcers or other problems  -an unusual or allergic reaction to aspirin, tartrazine dye, other medicines, dyes, or preservatives  -pregnant or trying to get pregnant  -breast-feeding  How should I use this medicine?  Take this medicine by mouth with a glass of water. Follow the directions on the package or prescription  label. You can take this medicine with or without food. If it upsets your stomach, take it with food. Do not take your medicine more often than directed.  Talk to your pediatrician regarding the use of this medicine in children. While this drug may be prescribed for children as young as 12 years of age for selected conditions, precautions do apply. Children and teenagers should not use this medicine to treat chicken pox or flu symptoms unless directed by a doctor.  Patients over 65 years old may have a stronger reaction and need a smaller dose.  Overdosage: If you think you have taken too much of this medicine contact a poison control center or emergency room at once.  NOTE: This medicine is only for you. Do not share this medicine with others.  What if I miss a dose?  If you are taking this medicine on a regular schedule and miss a dose, take it as soon as you can. If it is almost time for your next dose, take only that dose. Do not take double or extra doses.  What may interact with this medicine?  Do not take this medicine with any of the following medications:  -cidofovir  -ketorolac  -probenecid  This medicine may also interact with the following medications:  -alcohol  -alendronate  -bismuth subsalicylate  -flavocoxid  -herbal supplements like feverfew, garlic, marina, ginkgo biloba, horse chestnut  -medicines for diabetes or glaucoma like acetazolamide, methazolamide  -medicines for gout  -medicines that treat or prevent blood clots like enoxaparin, heparin, ticlopidine, warfarin  -other aspirin and aspirin-like medicines  -NSAIDs, medicines for pain and inflammation, like ibuprofen or naproxen  -pemetrexed  -sulfinpyrazone  -varicella live vaccine  This list may not describe all possible interactions. Give your health care provider a list of all the medicines, herbs, non-prescription drugs, or dietary supplements you use. Also tell them if you smoke, drink alcohol, or use illegal drugs. Some items may  interact with your medicine.  What should I watch for while using this medicine?  If you are treating yourself for pain, tell your doctor or health care professional if the pain lasts more than 10 days, if it gets worse, or if there is a new or different kind of pain. Tell your doctor if you see redness or swelling. Also, check with your doctor if you have a fever that lasts for more than 3 days. Only take this medicine to prevent heart attacks or blood clotting if prescribed by your doctor or health care professional.  Do not take aspirin or aspirin-like medicines with this medicine. Too much aspirin can be dangerous. Always read the labels carefully.  This medicine can irritate your stomach or cause bleeding problems. Do not smoke cigarettes or drink alcohol while taking this medicine. Do not lie down for 30 minutes after taking this medicine to prevent irritation to your throat.  If you are scheduled for any medical or dental procedure, tell your healthcare provider that you are taking this medicine. You may need to stop taking this medicine before the procedure.  This medicine may be used to treat migraines. If you take migraine medicines for 10 or more days a month, your migraines may get worse. Keep a diary of headache days and medicine use. Contact your healthcare professional if your migraine attacks occur more frequently.  What side effects may I notice from receiving this medicine?  Side effects that you should report to your doctor or health care professional as soon as possible:  -allergic reactions like skin rash, itching or hives, swelling of the face, lips, or tongue  -breathing problems  -changes in hearing, ringing in the ears  -confusion  -general ill feeling or flu-like symptoms  -pain on swallowing  -redness, blistering, peeling or loosening of the skin, including inside the mouth or nose  -signs and symptoms of bleeding such as bloody or black, tarry stools; red or dark-brown urine; spitting up  blood or brown material that looks like coffee grounds; red spots on the skin; unusual bruising or bleeding from the eye, gums, or nose  -trouble passing urine or change in the amount of urine  -unusually weak or tired  -yellowing of the eyes or skin  Side effects that usually do not require medical attention (report to your doctor or health care professional if they continue or are bothersome):  -diarrhea or constipation  -headache  -nausea, vomiting  -stomach gas, heartburn  This list may not describe all possible side effects. Call your doctor for medical advice about side effects. You may report side effects to FDA at 3-652-HOH-4023.  Where should I keep my medicine?  Keep out of the reach of children.  Store at room temperature between 15 and 30 degrees C (59 and 86 degrees F). Protect from heat and moisture. Do not use this medicine if it has a strong vinegar smell. Throw away any unused medicine after the expiration date.  NOTE: This sheet is a summary. It may not cover all possible information. If you have questions about this medicine, talk to your doctor, pharmacist, or health care provider.  © 2018 [employer] (2014-08-19 11:30:31)      · Is patient discharged on Warfarin / Coumadin?   No     Total Hip Replacement, Care After  These instructions give you information on caring for yourself after your procedure. Your doctor also may give you specific instructions. Call your doctor if you have any problems or questions after your procedure.  Follow these instructions at home:  Your doctor will give you instructions on what types of movements are okay and not okay.  · Take medicines as told by your doctor.  · Do not take baths, swim, or use a hot tub until your doctor says that it is okay.  · Avoid lifting until your doctor says it is okay.  · Use a raised toilet as told by your doctor.  · Avoid sitting in low chairs as told by your doctor.  · Use crutches or a walker as told by your  doctor.  · Rest often, but move around as much as you can. Movement helps you to heal and helps to prevent problems.  · Wear special socks (compression stockings) as told by your doctor. These socks help to prevent blood clots and lessen swelling of your legs.  · Follow instructions from your doctor about how to take care of your cut from surgery (incision). Make sure you:  ¨ Wash your hands with soap and water before you change your bandage (dressing). If you cannot use soap and water, use hand .  ¨ Change your bandage as told by your doctor.  ¨ Leave stitches (sutures), skin glue, or skin tape (adhesive) strips in place. These may need to stay in place for 2 weeks or longer. If the tape strips get loose and curl up, you may trim the loose edges. Do not remove the strips completely unless your doctor says it is okay.  Contact a doctor if:  · You have trouble breathing.  · You have fluid coming from your surgery site.  · You have redness or swelling at your surgery site.  · You have a bad smell coming from your surgery site.  · You have bleeding that will not stop.  · Your surgical cut opens up after sutures (stitches) or staples are removed.  · You have a fever.  Get help right away if:  · You have a rash.  · You have pain or puffiness on your thigh or on the back of your lower leg.  · You have shortness of breath or chest pain.  This information is not intended to replace advice given to you by your health care provider. Make sure you discuss any questions you have with your health care provider.  Document Released: 03/11/2013 Document Revised: 08/21/2017 Document Reviewed: 02/18/2015  [employer] Interactive Patient Education © 2017 [employer]      How can pain medicine affect me?  You were prescribed pain medicine. This medicine may:  · Make you tired or sleepy.  · Make you feel dizzy.  · Affect how well you can:  ¨ Drive  ¨ Do certain activities.  Pain medicine may not make all of your pain go away.  You should be comfortable enough to:  · Move.  · Breathe.  · Take care of yourself.  How often should I take pain medicine and how much should I take?  · Take pain medicine only as told by your doctor and only as needed for pain.  · You do not need to take pain medicine if you are not having pain, unless your doctor tells you to do that.  · You can take less than the prescribed dose if you find that less medicine helps your pain.  · If you have very bad (severe) pain, call your doctor. Do not take more pills than told by your doctor. Do not take pills more often than told by your doctor.  What should I avoid while I am taking pain medicine?  Follow these instructions after you start taking pain medicine, while you are taking the medicine, and for 8 hours after you stop taking the medicine:  · Do not drive.  · Do not use machinery.  · Do not use power tools.  · Do not sign legal documents.  · Do not drink alcohol.  · Do not take sleeping pills.  · Do not take care of children by yourself.  · Do not do any activities that involve climbing or being in high places.  · Do not go into any body of water unless there is an adult nearby who can watch and help you. This includes:  ¨ Lakes.  ¨ Rivers.  ¨ Oceans.  ¨ Spas.  ¨ Swimming pools.  How can I keep others safe while I am taking pain medicine?  · Store your pain medicine as told by your doctor. Make sure that you keep it where children and pets cannot reach it.  · Do not share your pain medicine with anyone.  · Do not save any leftover pills. If you have any leftover pain medicine, get rid of it or destroy it as told by your doctor.  What else do I need to know about taking pain medicine?  · Use a poop (stool) softener if you have trouble pooping (constipation) because of your pain medicine. Eating more fruits and vegetables also helps with constipation.  · Write down the times when you take your pain medicine. Look at the times before you take your next dose of  medicine.  · Your pain medicine might have acetaminophen in it. Do not take any other acetaminophen while you are taking this medicine. An overdose of acetaminophen can do very bad damage to your liver. If you are taking any medicines in addition to your pain medicine, check the active ingredients on those medicines to see if acetaminophen is listed.  When should I call my doctor?  · Your medicine is not helping the pain.  · You do either of these soon after you take the medicine:  ¨ Throw up (vomit).  ¨ Have watery poop (diarrhea).  · You have new pain in areas that did not hurt before.  · You have an allergic reaction to your medicine. This may include:  ¨ Feeling itchy.  ¨ Swelling.  ¨ Feeling dizzy.  ¨ Getting a new rash.  · You cannot put up with feeling:  ¨ Dizzy.  ¨ Sick to your stomach (nauseous).  When should I call 911 or go to the emergency room?  · You pass out (faint).  · You feel very confused.  · You throw up again and again.  · Your skin or lips turn pale or bluish in color.  · You are:  ¨ Short of breath.  ¨ Breathing much more slowly than usual.  · You have a very bad allergic reaction to your medicine. This includes:  ¨ Developing a swollen tongue.  ¨ Having trouble breathing.  This information is not intended to replace advice given to you by your health care provider. Make sure you discuss any questions you have with your health care provider.  Document Released: 06/05/2009 Document Revised: 08/24/2017 Document Reviewed: 10/22/2015  © 2017 [employer]    Depression / Suicide Risk    As you are discharged from this [facility] facility, it is important to learn how to keep safe from harming yourself.    Recognize the warning signs:  · Abrupt changes in personality, positive or negative- including increase in energy   · Giving away possessions  · Change in eating patterns- significant weight changes-  positive or negative  · Change in sleeping patterns- unable to sleep or sleeping all the  time   · Unwillingness or inability to communicate  · Depression  · Unusual sadness, discouragement and loneliness  · Talk of wanting to die  · Neglect of personal appearance   · Rebelliousness- reckless behavior  · Withdrawal from people/activities they love  · Confusion- inability to concentrate     If you or a loved one observes any of these behaviors or has concerns about self-harm, here's what you can do:  · Talk about it- your feelings and reasons for harming yourself  · Remove any means that you might use to hurt yourself (examples: pills, rope, extension cords, firearm)  · Get professional help from the community (Mental Health, Substance Abuse, psychological counseling)  · Do not be alone:Call your Safe Contact- someone whom you trust who will be there for you.  · Call your local CRISIS HOTLINE 135-8831 or 712-052-3029  · Call your local Children's Mobile Crisis Response Team [place] (626) 275-2982 or www.Hybrent  · Call the toll free National Suicide Prevention Hotlines   · National Suicide Prevention Lifeline 002-704-RXMC (6649)  · National Hope Line Network 800-SUICIDE (343-9118)

## 2019-04-10 NOTE — PROGRESS NOTES
Arrived to room via hospital bed, Awake, A&O, VSS, = . island dressing is CDI, ice in place. CMS intact denies N/T  in upper/lower extremities.  has void since surgery. IVF infusing. O2 sats remain greater than 90%.   in place to the hand. Denies N/V, regular diet order in place. 2 RN skin check completed with Vitally RN, Skin is intact with exception to right knee abrasion and incision cite. .  Old surgical wounds noted to the abdomen upon assessment.  Admit complete.  Hourly rounding in place, call light is within reach.  Assessment completed as charted.

## 2019-04-10 NOTE — CARE PLAN
Problem: Skin Integrity  Goal: Skin Integrity is maintained or improved    Intervention: Measure and document any skin breakdown  Assessed for signs of skin breakdown. Encouraging increased mobility and repositioning to prevent development of pressure ulcers. Surgical dressing to right hip is clean, dry, intact without s/sx of infection. Polar ice in place.       Problem: Pain  Goal: Alleviation of Pain or a reduction in pain to the patient's comfort goal    Intervention: Pain Management-Medications  Provided patient education on pain management using pain scale. Patient states pain is 7/10; medicated per MAR. Patient verbalized understanding of education and communicates pain level effectively with RN.      Problem: Risk for Deep Vein Thrombosis/Venous Thromboembolism  Goal: DVT/VTE Prevention Measures in Place    Intervention: Intermittent sequential compression device/foot pumps/CARLI hose  Patient receiving Lovenox daily for DVT prophylaxis. SCDs in place. Encouraging increased mobility and ambulation.

## 2019-04-10 NOTE — OR NURSING
Patient is doing well in recovery. Denies pain or nausea. Immobilizer in place with ice pack to site. Wife is at bedside and they both are laughing and visiting. Belongings at bedside. Report called and patient is ready for go to his room.

## 2019-04-10 NOTE — DISCHARGE SUMMARY
DISCHARGE SUMMARY    PATIENTS NAME: Michael Caceres    MRN: [id number]    CSN: [id number]    ADMIT DATE:  4/9/2019    DISCHARGE DATE: 4/10/2019    ADMIT MD: Shawn Chaudhari M.D.    DISCHARGE MD: Shawn Chaudhari M.D.    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    SECONDARY DIAGNOSIS:none    PROCEDURES: 4//9/19  Shawn Chaudhari M.D.Revision right total hip arthroplasty involving the acetabular liner and femoral head components    CONSULTATIONS: Shawn Chaudhari M.D.     HOSPITAL COURSE: Patient is a 63 year old male who had a total hip 11/2017 he had been doing well till he had an injury at the gym.  He had immediate pain in his right hip that made him seek help at the ER.  Dr Chaudhari felt that the nature of the patients broken polyethylene liner necessitated surgical intervention/revision of his hip replacement.  After explaining the indications, risks, benefits, and alternatives the patient wished to proceed with surgical intervention.  The patient was taken to the OR for the above mentioned procedure.  He had no complications and minimal blood loss. He has done well with mobilization and his pain has been well controlled with oral medications. He is now ready for DC at this time.     DISCHARGE LOCATION:home with spouse    DVT PROPHYLAXSIS:aspirin 325mg po BID for two weeks due to history of prior DVT    ANTIBIOTICS:completed    WEIGHT BEARING:weight bearing as tolerated right lower extremity with posterior hip precautions    FOLLOW UP: 10-14 days post operatively with Dr Shawn Chaudhari M.D.    DISCHARGE DIAGNOSIS:Status post revision right total hip arthroplasty involving the acetabular liner and femoral head components.    MEDICATIONS:   Current Outpatient Prescriptions   Medication Sig Dispense Refill   • oxyCODONE immediate release (ROXICODONE) 10 MG  immediate release tablet Take 1 Tab by mouth every 3 hours as needed for up to 15 days. 80 Tab 0   • docusate sodium (COLACE) 100 MG Cap Take 1 Cap by mouth 2 times a day. 60 Cap 0   • aspirin (ASA) 325 MG Tab Take 1 Tab by mouth 2 Times a Day for 14 days. 28 Tab 0   • mupirocin (BACTROBAN) 2 % Ointment Apply 1 Tube to affected area(s) 3 times a day. 1 Tube 0

## 2019-04-10 NOTE — PROGRESS NOTES
63yoM with acute failure of right total hip arthroplasty s/p revision RACHEL (head/liner exchange) 4/9.    S: Doing well postop, able to ambulate, pain controlled, eager for discharge today.    O:  Vitals:    04/10/19 1238   BP: 120/73   Pulse: 73   Resp: 16   Temp: 36.5 °C (97.7 °F)   SpO2: 97%     Exam:  General-NAD, alert and oriented, following commands  RLE-hip dressing c/d/i, +EHL/FHL/TA/GS motor, SILT in foot with palp dp pulse    Hct: 39.6    A: 63yoM with acute failure of right total hip arthroplasty s/p revision RACHEL (head/liner exchange) 4/9.  Postop xrays look good.    Recs:  --WBAT RLE  --posterior hip precautions  --ancef/vanc x 24hrs postop (hx of MRSA infection to abdomen)  --mupirocin x 5 days postop  --PT/OT for mobilization  --discharge to home today  --fu 2 weeks postop

## 2019-11-22 ENCOUNTER — HOSPITAL ENCOUNTER (OUTPATIENT)
Dept: RADIOLOGY | Facility: MEDICAL CENTER | Age: 64
End: 2019-11-22
Attending: ORTHOPAEDIC SURGERY
Payer: MEDICARE

## 2019-11-22 ENCOUNTER — HOSPITAL ENCOUNTER (OUTPATIENT)
Dept: RADIOLOGY | Facility: MEDICAL CENTER | Age: 64
End: 2019-11-22

## 2019-11-22 DIAGNOSIS — R52 PAIN: ICD-10-CM

## 2019-11-22 DIAGNOSIS — M97.02XD PERIPROSTHETIC FRACTURE AROUND INTERNAL PROSTHETIC LEFT HIP JOINT, SUBSEQUENT ENCOUNTER: ICD-10-CM

## 2019-11-22 PROCEDURE — 73552 X-RAY EXAM OF FEMUR 2/>: CPT | Mod: LT

## 2019-11-22 PROCEDURE — A9503 TC99M MEDRONATE: HCPCS

## 2019-12-09 ENCOUNTER — HOSPITAL ENCOUNTER (OUTPATIENT)
Dept: LAB | Facility: MEDICAL CENTER | Age: 64
End: 2019-12-09
Attending: FAMILY MEDICINE
Payer: MEDICARE

## 2019-12-09 PROCEDURE — 84403 ASSAY OF TOTAL TESTOSTERONE: CPT

## 2019-12-09 PROCEDURE — 84270 ASSAY OF SEX HORMONE GLOBUL: CPT

## 2019-12-09 PROCEDURE — 84402 ASSAY OF FREE TESTOSTERONE: CPT

## 2019-12-09 PROCEDURE — 36415 COLL VENOUS BLD VENIPUNCTURE: CPT

## 2019-12-11 LAB
SHBG SERPL-SCNC: 75 NMOL/L (ref 11–80)
TESTOST FREE MFR SERPL: 1 % (ref 1.6–2.9)
TESTOST FREE SERPL-MCNC: 13 PG/ML (ref 47–244)
TESTOST SERPL-MCNC: 130 NG/DL (ref 300–720)

## 2020-03-11 ENCOUNTER — HOSPITAL ENCOUNTER (OUTPATIENT)
Dept: LAB | Facility: MEDICAL CENTER | Age: 65
End: 2020-03-11
Attending: FAMILY MEDICINE
Payer: MEDICARE

## 2020-03-11 PROCEDURE — 36415 COLL VENOUS BLD VENIPUNCTURE: CPT

## 2020-03-11 PROCEDURE — 80061 LIPID PANEL: CPT

## 2020-03-11 PROCEDURE — 84403 ASSAY OF TOTAL TESTOSTERONE: CPT

## 2020-03-11 PROCEDURE — 84270 ASSAY OF SEX HORMONE GLOBUL: CPT

## 2020-03-11 PROCEDURE — 84402 ASSAY OF FREE TESTOSTERONE: CPT

## 2020-03-11 PROCEDURE — 84153 ASSAY OF PSA TOTAL: CPT | Mod: GA

## 2020-03-11 PROCEDURE — 80053 COMPREHEN METABOLIC PANEL: CPT

## 2020-03-11 PROCEDURE — 84443 ASSAY THYROID STIM HORMONE: CPT

## 2020-03-12 LAB
ALBUMIN SERPL BCP-MCNC: 4.3 G/DL (ref 3.2–4.9)
ALBUMIN/GLOB SERPL: 1.9 G/DL
ALP SERPL-CCNC: 53 U/L (ref 30–99)
ALT SERPL-CCNC: 22 U/L (ref 2–50)
ANION GAP SERPL CALC-SCNC: 5 MMOL/L (ref 7–16)
AST SERPL-CCNC: 24 U/L (ref 12–45)
BILIRUB SERPL-MCNC: 0.6 MG/DL (ref 0.1–1.5)
BUN SERPL-MCNC: 21 MG/DL (ref 8–22)
CALCIUM SERPL-MCNC: 10.3 MG/DL (ref 8.5–10.5)
CHLORIDE SERPL-SCNC: 106 MMOL/L (ref 96–112)
CHOLEST SERPL-MCNC: 116 MG/DL (ref 100–199)
CO2 SERPL-SCNC: 28 MMOL/L (ref 20–33)
CREAT SERPL-MCNC: 0.75 MG/DL (ref 0.5–1.4)
FASTING STATUS PATIENT QL REPORTED: NORMAL
GLOBULIN SER CALC-MCNC: 2.3 G/DL (ref 1.9–3.5)
GLUCOSE SERPL-MCNC: 94 MG/DL (ref 65–99)
HDLC SERPL-MCNC: 41 MG/DL
LDLC SERPL CALC-MCNC: 66 MG/DL
POTASSIUM SERPL-SCNC: 4.5 MMOL/L (ref 3.6–5.5)
PROT SERPL-MCNC: 6.6 G/DL (ref 6–8.2)
PSA SERPL-MCNC: 0.19 NG/ML (ref 0–4)
SODIUM SERPL-SCNC: 139 MMOL/L (ref 135–145)
TRIGL SERPL-MCNC: 47 MG/DL (ref 0–149)
TSH SERPL DL<=0.005 MIU/L-ACNC: 0.61 UIU/ML (ref 0.38–5.33)

## 2020-03-13 LAB
SHBG SERPL-SCNC: 49 NMOL/L (ref 11–80)
TESTOST FREE MFR SERPL: 1.3 % (ref 1.6–2.9)
TESTOST FREE SERPL-MCNC: 15 PG/ML (ref 47–244)
TESTOST SERPL-MCNC: 112 NG/DL (ref 300–720)

## 2020-04-13 ENCOUNTER — OFFICE VISIT (OUTPATIENT)
Dept: URGENT CARE | Facility: PHYSICIAN GROUP | Age: 65
End: 2020-04-13
Payer: MEDICARE

## 2020-04-13 ENCOUNTER — APPOINTMENT (OUTPATIENT)
Dept: RADIOLOGY | Facility: IMAGING CENTER | Age: 65
End: 2020-04-13
Attending: PHYSICIAN ASSISTANT
Payer: MEDICARE

## 2020-04-13 VITALS
BODY MASS INDEX: 29.7 KG/M2 | WEIGHT: 219 LBS | RESPIRATION RATE: 14 BRPM | TEMPERATURE: 98.1 F | OXYGEN SATURATION: 96 % | HEART RATE: 72 BPM | SYSTOLIC BLOOD PRESSURE: 118 MMHG | DIASTOLIC BLOOD PRESSURE: 80 MMHG

## 2020-04-13 DIAGNOSIS — S69.92XA INJURY OF LEFT HAND, INITIAL ENCOUNTER: ICD-10-CM

## 2020-04-13 DIAGNOSIS — S62.305A CLOSED DISPLACED FRACTURE OF FOURTH METACARPAL BONE OF LEFT HAND, UNSPECIFIED PORTION OF METACARPAL, INITIAL ENCOUNTER: ICD-10-CM

## 2020-04-13 PROCEDURE — 99203 OFFICE O/P NEW LOW 30 MIN: CPT | Performed by: PHYSICIAN ASSISTANT

## 2020-04-13 PROCEDURE — 73130 X-RAY EXAM OF HAND: CPT | Mod: TC,FY,LT | Performed by: PHYSICIAN ASSISTANT

## 2020-04-13 ASSESSMENT — ENCOUNTER SYMPTOMS
NUMBNESS: 0
TINGLING: 0
COUGH: 0
ARTHRALGIAS: 1
SHORTNESS OF BREATH: 0
JOINT SWELLING: 1
FEVER: 0
CHILLS: 0
VOMITING: 0
SENSORY CHANGE: 0
NAUSEA: 0
MYALGIAS: 0
WEAKNESS: 0

## 2020-04-13 ASSESSMENT — FIBROSIS 4 INDEX: FIB4 SCORE: 1.68

## 2020-04-13 ASSESSMENT — PAIN SCALES - GENERAL: PAINLEVEL: 10=SEVERE PAIN

## 2020-04-13 NOTE — PROGRESS NOTES
Subjective:      Eduard Caceres III is a 64 y.o. male who presents with Hand Injury (slipped on the shower last Saturday morning/ L hand pain)            Hand Injury   This is a new problem. Episode onset: 3 days ago. Patient slipped in the shower and landed on his left hand. Now complains of pain and swelling. The problem occurs constantly. The problem has been unchanged. Associated symptoms include arthralgias and joint swelling. Pertinent negatives include no chills, coughing, fever, myalgias, nausea, numbness, rash, vomiting or weakness. The symptoms are aggravated by bending (bending, palpation). He has tried immobilization (hand and wrist brace ) for the symptoms. The treatment provided mild relief.       Past Medical History:   Diagnosis Date   • Back pain    • Blood clotting disorder (HCC)     PE- years ago   • BPH (benign prostatic hypertrophy)    • Congestive heart failure (HCC)    • Dental disorder     Upper plate   • Diabetes (HCC)     NIIDM   • Campo catheter in place    • Hip pain    • Hypertension    • Infectious disease     OA- generalized   • MRSA (methicillin resistant Staphylococcus aureus) colonization     abdominal wound- 2017 No open wounds   • Neuropathy (HCC)    • OA (osteoarthritis)    • Snoring     Neg sleep study   • Urinary retention        Past Surgical History:   Procedure Laterality Date   • HIP REVISION TOTAL Right 4/9/2019    Procedure: REVISION, TOTAL ARTHROPLASTY, HIP-  AND OTHER INDICATED PROCEDURES;  Surgeon: Shawn Chaudhari M.D.;  Location: [facility];  Service: Orthopedics   • HIP ARTHROPLASTY TOTAL Right 11/14/2017    Procedure: HIP ARTHROPLASTY TOTAL;  Surgeon: Shawn Chaudhari M.D.;  Location: [facility];  Service: Orthopedics   • FEMUR ORIF Left 11/30/2016    Procedure: FEMUR OPEN REDUCTION INTERNAL FIXATION ;  Surgeon: Shawn Chaudhari M.D.;  Location: [facility];  Service:    • HIP ARTHROPLASTY TOTAL Left 2007   • BOWEL  RESECTION      several times   • FACIAL FRACTURE ORIF     • HARDWARE REMOVAL SPINE     • HERNIA REPAIR      abdominal with mesh   • HERNIA REPAIR      x 8   • LUMBAR FUSION ANTERIOR     • LUMBAR LAMINECTOMY DISKECTOMY      multiple surgeries 199, 2000, 2001 and 2002       No family history on file.    .No Known Allergies    Medications, Allergies, and current problem list reviewed today in Epic    Review of Systems   Constitutional: Negative for chills, fever and malaise/fatigue.   Respiratory: Negative for cough and shortness of breath.    Gastrointestinal: Negative for nausea and vomiting.   Musculoskeletal: Positive for arthralgias, joint pain and joint swelling. Negative for myalgias.   Skin: Negative for rash.   Neurological: Negative for tingling, sensory change, weakness and numbness.     All other systems reviewed and are negative.        Objective:     /80   Pulse 72   Temp 36.7 °C (98.1 °F) (Temporal)   Resp 14   Wt 99.3 kg (219 lb)   SpO2 96%   BMI 29.70 kg/m²      Physical Exam  Constitutional:       General: He is not in acute distress.  HENT:      Head: Normocephalic and atraumatic.   Eyes:      Conjunctiva/sclera: Conjunctivae normal.   Cardiovascular:      Rate and Rhythm: Normal rate.   Pulmonary:      Effort: Pulmonary effort is normal. No respiratory distress.   Musculoskeletal:        Hands:    Skin:     General: Skin is warm and dry.   Neurological:      General: No focal deficit present.      Mental Status: He is alert and oriented to person, place, and time.   Psychiatric:         Mood and Affect: Mood normal.         Behavior: Behavior normal.         Thought Content: Thought content normal.         Judgment: Judgment normal.       X-ray : Radiologist read pending.  Per my review- closed mildly displaced fourth metacarpal fracture.           Assessment/Plan:     1. Closed displaced fracture of fourth metacarpal bone of left hand, unspecified portion of metacarpal, initial  encounter  DX-HAND 3+ LEFT    REFERRAL TO ORTHOPEDICS     Patient placed in Ulnar-gutter splint.  OTC Ibuprofen or Tylenol.    Will have patient follow-up with [facility] [place] per patient request.     Differential diagnoses, Supportive care, and indications for immediate follow-up discussed with patient.   Instructed to return to clinic or nearest emergency department for any change in condition, further concerns, or worsening of symptoms.    The patient demonstrated a good understanding and agreed with the treatment plan.    Carla Taylor P.A.-C.

## 2020-04-24 ENCOUNTER — HOSPITAL ENCOUNTER (OUTPATIENT)
Dept: LAB | Facility: MEDICAL CENTER | Age: 65
End: 2020-04-24
Attending: INTERNAL MEDICINE
Payer: MEDICARE

## 2020-04-24 LAB
ALBUMIN SERPL BCP-MCNC: 4.5 G/DL (ref 3.2–4.9)
ALBUMIN/GLOB SERPL: 2 G/DL
ALP SERPL-CCNC: 69 U/L (ref 30–99)
ALT SERPL-CCNC: 20 U/L (ref 2–50)
ANION GAP SERPL CALC-SCNC: 11 MMOL/L (ref 7–16)
AST SERPL-CCNC: 20 U/L (ref 12–45)
BILIRUB SERPL-MCNC: 0.5 MG/DL (ref 0.1–1.5)
BUN SERPL-MCNC: 18 MG/DL (ref 8–22)
CALCIUM SERPL-MCNC: 9.9 MG/DL (ref 8.5–10.5)
CHLORIDE SERPL-SCNC: 100 MMOL/L (ref 96–112)
CHOLEST SERPL-MCNC: 135 MG/DL (ref 100–199)
CO2 SERPL-SCNC: 27 MMOL/L (ref 20–33)
CREAT SERPL-MCNC: 0.65 MG/DL (ref 0.5–1.4)
CREAT UR-MCNC: 92.05 MG/DL
FASTING STATUS PATIENT QL REPORTED: NORMAL
GLOBULIN SER CALC-MCNC: 2.3 G/DL (ref 1.9–3.5)
GLUCOSE SERPL-MCNC: 159 MG/DL (ref 65–99)
HCT VFR BLD AUTO: 45.3 % (ref 42–52)
HDLC SERPL-MCNC: 53 MG/DL
HGB BLD-MCNC: 15.5 G/DL (ref 14–18)
LDLC SERPL CALC-MCNC: 72 MG/DL
MICROALBUMIN UR-MCNC: <1.2 MG/DL
MICROALBUMIN/CREAT UR: NORMAL MG/G (ref 0–30)
POTASSIUM SERPL-SCNC: 4 MMOL/L (ref 3.6–5.5)
PROT SERPL-MCNC: 6.8 G/DL (ref 6–8.2)
SODIUM SERPL-SCNC: 138 MMOL/L (ref 135–145)
TRIGL SERPL-MCNC: 51 MG/DL (ref 0–149)

## 2020-04-24 PROCEDURE — 80053 COMPREHEN METABOLIC PANEL: CPT

## 2020-04-24 PROCEDURE — 82570 ASSAY OF URINE CREATININE: CPT

## 2020-04-24 PROCEDURE — 36415 COLL VENOUS BLD VENIPUNCTURE: CPT

## 2020-04-24 PROCEDURE — 85018 HEMOGLOBIN: CPT

## 2020-04-24 PROCEDURE — 80061 LIPID PANEL: CPT

## 2020-04-24 PROCEDURE — 82043 UR ALBUMIN QUANTITATIVE: CPT

## 2020-04-24 PROCEDURE — 85014 HEMATOCRIT: CPT

## 2020-05-21 ENCOUNTER — HOSPITAL ENCOUNTER (OUTPATIENT)
Dept: LAB | Facility: MEDICAL CENTER | Age: 65
End: 2020-05-21
Attending: FAMILY MEDICINE
Payer: MEDICARE

## 2020-05-21 PROCEDURE — 36415 COLL VENOUS BLD VENIPUNCTURE: CPT

## 2020-05-21 PROCEDURE — 84270 ASSAY OF SEX HORMONE GLOBUL: CPT

## 2020-05-21 PROCEDURE — 84403 ASSAY OF TOTAL TESTOSTERONE: CPT

## 2020-05-21 PROCEDURE — 84402 ASSAY OF FREE TESTOSTERONE: CPT

## 2020-05-23 LAB
SHBG SERPL-SCNC: 62 NMOL/L (ref 11–80)
TESTOST FREE MFR SERPL: 1.1 % (ref 1.6–2.9)
TESTOST FREE SERPL-MCNC: 10 PG/ML (ref 47–244)
TESTOST SERPL-MCNC: 85 NG/DL (ref 300–720)

## 2020-07-30 ENCOUNTER — HOSPITAL ENCOUNTER (OUTPATIENT)
Dept: LAB | Facility: MEDICAL CENTER | Age: 65
End: 2020-07-30
Attending: INTERNAL MEDICINE
Payer: MEDICARE

## 2020-07-30 LAB
EST. AVERAGE GLUCOSE BLD GHB EST-MCNC: 128 MG/DL
HBA1C MFR BLD: 6.1 % (ref 0–5.6)

## 2020-07-30 PROCEDURE — 36415 COLL VENOUS BLD VENIPUNCTURE: CPT | Mod: GA

## 2020-07-30 PROCEDURE — 83036 HEMOGLOBIN GLYCOSYLATED A1C: CPT | Mod: GA

## 2020-08-21 ENCOUNTER — HOSPITAL ENCOUNTER (OUTPATIENT)
Dept: LAB | Facility: MEDICAL CENTER | Age: 65
End: 2020-08-21
Attending: FAMILY MEDICINE
Payer: MEDICARE

## 2020-08-21 PROCEDURE — 84402 ASSAY OF FREE TESTOSTERONE: CPT

## 2020-08-21 PROCEDURE — 84270 ASSAY OF SEX HORMONE GLOBUL: CPT

## 2020-08-21 PROCEDURE — 84403 ASSAY OF TOTAL TESTOSTERONE: CPT

## 2020-08-21 PROCEDURE — 36415 COLL VENOUS BLD VENIPUNCTURE: CPT

## 2020-08-23 LAB
SHBG SERPL-SCNC: 31 NMOL/L (ref 11–80)
TESTOST FREE MFR SERPL: 1.8 % (ref 1.6–2.9)
TESTOST FREE SERPL-MCNC: 40 PG/ML (ref 47–244)
TESTOST SERPL-MCNC: 219 NG/DL (ref 300–720)

## 2020-08-24 ENCOUNTER — OFFICE VISIT (OUTPATIENT)
Dept: URGENT CARE | Facility: PHYSICIAN GROUP | Age: 65
End: 2020-08-24
Payer: MEDICARE

## 2020-08-24 VITALS
SYSTOLIC BLOOD PRESSURE: 118 MMHG | DIASTOLIC BLOOD PRESSURE: 72 MMHG | WEIGHT: 225 LBS | BODY MASS INDEX: 30.48 KG/M2 | OXYGEN SATURATION: 97 % | HEIGHT: 72 IN | HEART RATE: 67 BPM | TEMPERATURE: 98.6 F | RESPIRATION RATE: 20 BRPM

## 2020-08-24 DIAGNOSIS — M70.52 PES ANSERINUS BURSITIS OF LEFT KNEE: ICD-10-CM

## 2020-08-24 PROCEDURE — 99213 OFFICE O/P EST LOW 20 MIN: CPT | Performed by: FAMILY MEDICINE

## 2020-08-24 ASSESSMENT — ENCOUNTER SYMPTOMS
FOCAL WEAKNESS: 0
SHORTNESS OF BREATH: 0
VOMITING: 0
FEVER: 0

## 2020-08-24 ASSESSMENT — FIBROSIS 4 INDEX: FIB4 SCORE: 1.490711984999859798

## 2020-08-24 NOTE — PROGRESS NOTES
Subjective:     Michael Caceres III is a 65 y.o. male who presents for Knee Pain (Left knee x 1 week )    HPI  Pt presents for evaluation of a new problem   Pt with left knee pain for the past 1 week   Started after a twisting type injury   Was walking on treadmill and tripped   Pain is mainly along the medial aspect   Pain is worse with ambulation  No instability or locking  No past pain in left knee   Right knee bothers him chronically     Review of Systems   Constitutional: Negative for fever.   Respiratory: Negative for shortness of breath.    Cardiovascular: Negative for chest pain.   Gastrointestinal: Negative for vomiting.   Skin: Negative for rash.   Neurological: Negative for focal weakness.     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  MEDS:   Current Outpatient Medications:   •  mupirocin (BACTROBAN) 2 % Ointment, Apply 1 Tube to affected area(s) 3 times a day., Disp: 1 Tube, Rfl: 0  •  acetaminophen (TYLENOL) 500 MG Tab, Take 1-2 Tabs by mouth every 6 hours., Disp: 30 Tab, Rfl: 0  •  insulin regular (HUMULIN R) 100 Unit/mL Solution, Inject 1-6 Units as instructed 4 Times a Day,Before Meals and at Bedtime., Disp: , Rfl:   •  dextrose 50% (D50W) 50 % Solution, 25 mL by Intravenous route as needed (If FSBG is less than or equal to 70 mg/dL and If patient is NPO)., Disp: , Rfl: 0  •  sitagliptin (JANUVIA) 50 MG Tab, Take 1 Tab by mouth 2 Times a Day., Disp: 30 Tab, Rfl: 3  •  docusate sodium 100 MG Cap, Take 100 mg by mouth 2 Times a Day., Disp: , Rfl:   •  gabapentin (NEURONTIN) 800 MG tablet, Take 800 mg by mouth 3 times a day., Disp: , Rfl:   •  sitagliptan-metformin  (JANUMET)  MG per tablet, Take 1 Tab by mouth 2 times a day, with meals., Disp: , Rfl:   •  NIACIN PO, Take 1 Tab by mouth every day., Disp: , Rfl:   •  Cholecalciferol 400 UNIT Cap, Take 800 Units by mouth every day., Disp: , Rfl:   •  Calcium-Magnesium-Vitamin D (CALCIUM MAGNESIUM PO), Take 1 Tab by mouth every day., Disp: , Rfl:   •  Multiple Vitamins-Minerals (MULTIVITAMIN MEN 50+ PO), Take 1 Tab by mouth every day., Disp: , Rfl:   •  duloxetine (CYMBALTA) 60 MG Cap DR Particles delayed-release capsule, Take 90 mg by mouth every day., Disp: , Rfl:   •  hydrochlorothiazide (HYDRODIURIL) 25 MG Tab, Take 12.5 mg by mouth every day., Disp: , Rfl:   •  omeprazole (PRILOSEC) 20 MG delayed-release capsule, Take 40 mg by mouth as needed., Disp: , Rfl:   •  Verapamil HCl  MG CAPSULE SR 24 HR, Take  by mouth., Disp: , Rfl:   •  docusate sodium (COLACE) 100 MG Cap, Take 1 Cap by mouth 2 times a day., Disp: 60 Cap, Rfl: 0  •  glucose 4 g 4 g Chew Tab, Take 4 Tabs by mouth as needed for Low Blood Sugar (If FSBG is less than or equal to 70 mg/dL and patient able to eat or drink)., Disp: , Rfl:   •  metformin (GLUCOPHAGE) 1000 MG tablet, Take 1 Tab by mouth 2 times a day, with meals. (Patient not taking: Reported on 8/24/2020), Disp: , Rfl:   •  bisacodyl (DULCOLAX) 10 MG Suppos, Insert 1 Suppository in rectum every 24 hours as needed (if sennosides, docusate, polyethylene glycol 3350, and/or magnesium hydroxide ineffective or not ordered). (Patient not taking: Reported on 8/24/2020), Disp: , Rfl: 0  ALLERGIES: No Known Allergies  SURGHX:   Past Surgical History:   Procedure Laterality Date   • HIP REVISION TOTAL Right 4/9/2019    Procedure: REVISION, TOTAL ARTHROPLASTY, HIP-  AND OTHER INDICATED PROCEDURES;  Surgeon: Shawn Chaudhari M.D.;  [facility]ERY [facility];  Service: Orthopedics   • HIP ARTHROPLASTY TOTAL Right 11/14/2017    Procedure: HIP ARTHROPLASTY TOTAL;  Surgeon: Shawn Chaudhari M.D.;   Location: SURGERY [facility];  Service: Orthopedics   • FEMUR ORIF Left 11/30/2016    Procedure: FEMUR OPEN REDUCTION INTERNAL FIXATION ;  Surgeon: Shawn Chaudhari M.D.;  Location: SURGERY [facility];  Service:    • HIP ARTHROPLASTY TOTAL Left 2007   • BOWEL RESECTION      several times   • FACIAL FRACTURE ORIF     • HARDWARE REMOVAL SPINE     • HERNIA REPAIR      abdominal with mesh   • HERNIA REPAIR      x 8   • LUMBAR FUSION ANTERIOR     • LUMBAR LAMINECTOMY DISKECTOMY      multiple surgeries 199, 2000, 2001 and 2002     SOCHX:  reports that he quit smoking about 2 years ago. His smoking use included cigarettes. He smoked 1.00 pack per day. He has never used smokeless tobacco. He reports that he does not drink alcohol or use drugs.  FH: Family history was reviewed, not contributing to acute injury      Objective:   /72   Pulse 67   Temp 37 °C (98.6 °F) (Temporal)   Resp 20   Ht 1.829 m (6')   Wt 102.1 kg (225 lb)   SpO2 97%   BMI 30.52 kg/m²     Physical Exam  Constitutional:       General: He is not in acute distress.     Appearance: He is well-developed. He is not diaphoretic.   HENT:      Head: Normocephalic and atraumatic.   Musculoskeletal:      Comments: Left knee  Appearance - No bruising, erythema, or deformity appreciated  Palpation - +TTP along pes anserinus and patellar tendon.  No tenderness to palpation along joint lines, hamstring tendons, or quads.  No palpable effusion.  ROM - FROM with crepitus  Strength - 5/5 throughout  Neuro - Sensation equal and intact bilaterally  Special testing - No laxity or pain with varus/valgus stress, neg anterior drawer, neg posterior drawer, neg Lachman's, neg Leann's   Skin:     General: Skin is warm and dry.      Findings: No rash.   Neurological:      Mental Status: He is alert and oriented to person, place, and time.   Psychiatric:         Behavior: Behavior normal.         Thought Content: Thought content normal.         Judgment:  Judgment normal.       Assessment/Plan:   Assessment    1. Pes anserinus bursitis of left knee    Patient is a 65-year-old male with peds anserine bursitis.  Will use topical Voltaren, hinged knee brace, and given exercises to start performing at home.  Expect full recovery will still take a few weeks.  Patient will follow-up on an as-needed basis if not making great improvements over the next 2 weeks.

## 2020-10-22 ENCOUNTER — HOSPITAL ENCOUNTER (OUTPATIENT)
Dept: LAB | Facility: MEDICAL CENTER | Age: 65
End: 2020-10-22
Attending: PSYCHIATRY & NEUROLOGY
Payer: MEDICARE

## 2020-10-22 LAB
BASOPHILS # BLD AUTO: 0.8 % (ref 0–1.8)
BASOPHILS # BLD: 0.09 K/UL (ref 0–0.12)
EOSINOPHIL # BLD AUTO: 0.13 K/UL (ref 0–0.51)
EOSINOPHIL NFR BLD: 1.2 % (ref 0–6.9)
ERYTHROCYTE [DISTWIDTH] IN BLOOD BY AUTOMATED COUNT: 55 FL (ref 35.9–50)
HCT VFR BLD AUTO: 48.3 % (ref 42–52)
HGB BLD-MCNC: 16 G/DL (ref 14–18)
IMM GRANULOCYTES # BLD AUTO: 0.03 K/UL (ref 0–0.11)
IMM GRANULOCYTES NFR BLD AUTO: 0.3 % (ref 0–0.9)
LYMPHOCYTES # BLD AUTO: 1.55 K/UL (ref 1–4.8)
LYMPHOCYTES NFR BLD: 14.5 % (ref 22–41)
MCH RBC QN AUTO: 29.9 PG (ref 27–33)
MCHC RBC AUTO-ENTMCNC: 33.1 G/DL (ref 33.7–35.3)
MCV RBC AUTO: 90.3 FL (ref 81.4–97.8)
MONOCYTES # BLD AUTO: 0.67 K/UL (ref 0–0.85)
MONOCYTES NFR BLD AUTO: 6.3 % (ref 0–13.4)
NEUTROPHILS # BLD AUTO: 8.23 K/UL (ref 1.82–7.42)
NEUTROPHILS NFR BLD: 76.9 % (ref 44–72)
NRBC # BLD AUTO: 0 K/UL
NRBC BLD-RTO: 0 /100 WBC
PLATELET # BLD AUTO: 282 K/UL (ref 164–446)
PMV BLD AUTO: 9.6 FL (ref 9–12.9)
RBC # BLD AUTO: 5.35 M/UL (ref 4.7–6.1)
WBC # BLD AUTO: 10.7 K/UL (ref 4.8–10.8)

## 2020-10-22 PROCEDURE — 84270 ASSAY OF SEX HORMONE GLOBUL: CPT

## 2020-10-22 PROCEDURE — 84402 ASSAY OF FREE TESTOSTERONE: CPT

## 2020-10-22 PROCEDURE — 36415 COLL VENOUS BLD VENIPUNCTURE: CPT

## 2020-10-22 PROCEDURE — 82607 VITAMIN B-12: CPT

## 2020-10-22 PROCEDURE — 84403 ASSAY OF TOTAL TESTOSTERONE: CPT

## 2020-10-22 PROCEDURE — 84153 ASSAY OF PSA TOTAL: CPT

## 2020-10-22 PROCEDURE — 85025 COMPLETE CBC W/AUTO DIFF WBC: CPT

## 2020-10-22 PROCEDURE — 80053 COMPREHEN METABOLIC PANEL: CPT

## 2020-10-23 LAB
ALBUMIN SERPL BCP-MCNC: 4.2 G/DL (ref 3.2–4.9)
ALBUMIN/GLOB SERPL: 1.8 G/DL
ALP SERPL-CCNC: 66 U/L (ref 30–99)
ALT SERPL-CCNC: 21 U/L (ref 2–50)
ANION GAP SERPL CALC-SCNC: 9 MMOL/L (ref 7–16)
AST SERPL-CCNC: 22 U/L (ref 12–45)
BILIRUB SERPL-MCNC: 0.4 MG/DL (ref 0.1–1.5)
BUN SERPL-MCNC: 14 MG/DL (ref 8–22)
CALCIUM SERPL-MCNC: 9.6 MG/DL (ref 8.5–10.5)
CHLORIDE SERPL-SCNC: 101 MMOL/L (ref 96–112)
CO2 SERPL-SCNC: 28 MMOL/L (ref 20–33)
CREAT SERPL-MCNC: 0.79 MG/DL (ref 0.5–1.4)
FASTING STATUS PATIENT QL REPORTED: NORMAL
GLOBULIN SER CALC-MCNC: 2.4 G/DL (ref 1.9–3.5)
GLUCOSE SERPL-MCNC: 135 MG/DL (ref 65–99)
POTASSIUM SERPL-SCNC: 4.1 MMOL/L (ref 3.6–5.5)
PROT SERPL-MCNC: 6.6 G/DL (ref 6–8.2)
PSA SERPL-MCNC: 0.68 NG/ML (ref 0–4)
SODIUM SERPL-SCNC: 138 MMOL/L (ref 135–145)
VIT B12 SERPL-MCNC: 660 PG/ML (ref 211–911)

## 2020-10-24 LAB
SHBG SERPL-SCNC: 33 NMOL/L (ref 11–80)
TESTOST FREE MFR SERPL: 1.8 % (ref 1.6–2.9)
TESTOST FREE SERPL-MCNC: 74 PG/ML (ref 47–244)
TESTOST SERPL-MCNC: 399 NG/DL (ref 300–720)

## 2021-02-02 ENCOUNTER — HOSPITAL ENCOUNTER (OUTPATIENT)
Dept: LAB | Facility: MEDICAL CENTER | Age: 66
End: 2021-02-02
Attending: INTERNAL MEDICINE
Payer: MEDICARE

## 2021-02-02 ENCOUNTER — HOSPITAL ENCOUNTER (OUTPATIENT)
Dept: LAB | Facility: MEDICAL CENTER | Age: 66
End: 2021-02-02
Attending: FAMILY MEDICINE
Payer: MEDICARE

## 2021-02-02 LAB
ALBUMIN SERPL BCP-MCNC: 4.3 G/DL (ref 3.2–4.9)
ALBUMIN/GLOB SERPL: 1.8 G/DL
ALP SERPL-CCNC: 57 U/L (ref 30–99)
ALT SERPL-CCNC: 24 U/L (ref 2–50)
ANION GAP SERPL CALC-SCNC: 10 MMOL/L (ref 7–16)
AST SERPL-CCNC: 32 U/L (ref 12–45)
BASOPHILS # BLD AUTO: 0.6 % (ref 0–1.8)
BASOPHILS # BLD: 0.04 K/UL (ref 0–0.12)
BILIRUB SERPL-MCNC: 0.5 MG/DL (ref 0.1–1.5)
BUN SERPL-MCNC: 12 MG/DL (ref 8–22)
CALCIUM SERPL-MCNC: 9.9 MG/DL (ref 8.5–10.5)
CHLORIDE SERPL-SCNC: 102 MMOL/L (ref 96–112)
CO2 SERPL-SCNC: 25 MMOL/L (ref 20–33)
CREAT SERPL-MCNC: 0.73 MG/DL (ref 0.5–1.4)
EOSINOPHIL # BLD AUTO: 0.09 K/UL (ref 0–0.51)
EOSINOPHIL NFR BLD: 1.3 % (ref 0–6.9)
ERYTHROCYTE [DISTWIDTH] IN BLOOD BY AUTOMATED COUNT: 58.1 FL (ref 35.9–50)
GLOBULIN SER CALC-MCNC: 2.4 G/DL (ref 1.9–3.5)
GLUCOSE SERPL-MCNC: 137 MG/DL (ref 65–99)
HCT VFR BLD AUTO: 47 % (ref 42–52)
HGB BLD-MCNC: 16.6 G/DL (ref 14–18)
IMM GRANULOCYTES # BLD AUTO: 0.02 K/UL (ref 0–0.11)
IMM GRANULOCYTES NFR BLD AUTO: 0.3 % (ref 0–0.9)
LYMPHOCYTES # BLD AUTO: 1.07 K/UL (ref 1–4.8)
LYMPHOCYTES NFR BLD: 15.4 % (ref 22–41)
MCH RBC QN AUTO: 32.2 PG (ref 27–33)
MCHC RBC AUTO-ENTMCNC: 35.3 G/DL (ref 33.7–35.3)
MCV RBC AUTO: 91.1 FL (ref 81.4–97.8)
MONOCYTES # BLD AUTO: 0.4 K/UL (ref 0–0.85)
MONOCYTES NFR BLD AUTO: 5.7 % (ref 0–13.4)
NEUTROPHILS # BLD AUTO: 5.34 K/UL (ref 1.82–7.42)
NEUTROPHILS NFR BLD: 76.7 % (ref 44–72)
NRBC # BLD AUTO: 0 K/UL
NRBC BLD-RTO: 0 /100 WBC
PLATELET # BLD AUTO: 235 K/UL (ref 164–446)
PMV BLD AUTO: 9.8 FL (ref 9–12.9)
POTASSIUM SERPL-SCNC: 3.9 MMOL/L (ref 3.6–5.5)
PROT SERPL-MCNC: 6.7 G/DL (ref 6–8.2)
PSA SERPL-MCNC: 0.48 NG/ML (ref 0–4)
RBC # BLD AUTO: 5.16 M/UL (ref 4.7–6.1)
SODIUM SERPL-SCNC: 137 MMOL/L (ref 135–145)
WBC # BLD AUTO: 7 K/UL (ref 4.8–10.8)

## 2021-02-02 PROCEDURE — 83036 HEMOGLOBIN GLYCOSYLATED A1C: CPT | Mod: GA

## 2021-02-02 PROCEDURE — 84153 ASSAY OF PSA TOTAL: CPT | Mod: GA

## 2021-02-02 PROCEDURE — 85025 COMPLETE CBC W/AUTO DIFF WBC: CPT

## 2021-02-02 PROCEDURE — 36415 COLL VENOUS BLD VENIPUNCTURE: CPT | Mod: GA

## 2021-02-02 PROCEDURE — 80053 COMPREHEN METABOLIC PANEL: CPT

## 2021-02-03 LAB
EST. AVERAGE GLUCOSE BLD GHB EST-MCNC: 123 MG/DL
HBA1C MFR BLD: 5.9 % (ref 0–5.6)

## 2021-03-03 ENCOUNTER — HOSPITAL ENCOUNTER (OUTPATIENT)
Dept: LAB | Facility: MEDICAL CENTER | Age: 66
End: 2021-03-03
Attending: FAMILY MEDICINE
Payer: MEDICARE

## 2021-03-03 PROCEDURE — 80053 COMPREHEN METABOLIC PANEL: CPT

## 2021-03-03 PROCEDURE — 36415 COLL VENOUS BLD VENIPUNCTURE: CPT

## 2021-03-03 PROCEDURE — 85027 COMPLETE CBC AUTOMATED: CPT

## 2021-03-03 PROCEDURE — 84153 ASSAY OF PSA TOTAL: CPT | Mod: GA

## 2021-03-04 LAB
ALBUMIN SERPL BCP-MCNC: 4.2 G/DL (ref 3.2–4.9)
ALBUMIN/GLOB SERPL: 1.8 G/DL
ALP SERPL-CCNC: 55 U/L (ref 30–99)
ALT SERPL-CCNC: 21 U/L (ref 2–50)
ANION GAP SERPL CALC-SCNC: 11 MMOL/L (ref 7–16)
AST SERPL-CCNC: 21 U/L (ref 12–45)
BILIRUB SERPL-MCNC: 0.4 MG/DL (ref 0.1–1.5)
BUN SERPL-MCNC: 9 MG/DL (ref 8–22)
CALCIUM SERPL-MCNC: 10.1 MG/DL (ref 8.5–10.5)
CHLORIDE SERPL-SCNC: 101 MMOL/L (ref 96–112)
CO2 SERPL-SCNC: 25 MMOL/L (ref 20–33)
CREAT SERPL-MCNC: 0.68 MG/DL (ref 0.5–1.4)
ERYTHROCYTE [DISTWIDTH] IN BLOOD BY AUTOMATED COUNT: 56.8 FL (ref 35.9–50)
GLOBULIN SER CALC-MCNC: 2.3 G/DL (ref 1.9–3.5)
GLUCOSE SERPL-MCNC: 144 MG/DL (ref 65–99)
HCT VFR BLD AUTO: 47.6 % (ref 42–52)
HGB BLD-MCNC: 16.5 G/DL (ref 14–18)
MCH RBC QN AUTO: 32.9 PG (ref 27–33)
MCHC RBC AUTO-ENTMCNC: 34.7 G/DL (ref 33.7–35.3)
MCV RBC AUTO: 94.8 FL (ref 81.4–97.8)
PLATELET # BLD AUTO: 243 K/UL (ref 164–446)
PMV BLD AUTO: 10.7 FL (ref 9–12.9)
POTASSIUM SERPL-SCNC: 4.8 MMOL/L (ref 3.6–5.5)
PROT SERPL-MCNC: 6.5 G/DL (ref 6–8.2)
PSA SERPL-MCNC: 0.68 NG/ML (ref 0–4)
RBC # BLD AUTO: 5.02 M/UL (ref 4.7–6.1)
SODIUM SERPL-SCNC: 137 MMOL/L (ref 135–145)
WBC # BLD AUTO: 12.9 K/UL (ref 4.8–10.8)

## 2021-04-02 ENCOUNTER — HOSPITAL ENCOUNTER (OUTPATIENT)
Dept: LAB | Facility: MEDICAL CENTER | Age: 66
End: 2021-04-02
Attending: FAMILY MEDICINE
Payer: MEDICARE

## 2021-04-02 ENCOUNTER — HOSPITAL ENCOUNTER (OUTPATIENT)
Dept: LAB | Facility: MEDICAL CENTER | Age: 66
End: 2021-04-02
Attending: INTERNAL MEDICINE
Payer: MEDICARE

## 2021-04-02 LAB
ERYTHROCYTE [DISTWIDTH] IN BLOOD BY AUTOMATED COUNT: 55.2 FL (ref 35.9–50)
HCT VFR BLD AUTO: 50.4 % (ref 42–52)
HGB BLD-MCNC: 17.4 G/DL (ref 14–18)
MCH RBC QN AUTO: 32.4 PG (ref 27–33)
MCHC RBC AUTO-ENTMCNC: 34.5 G/DL (ref 33.7–35.3)
MCV RBC AUTO: 93.9 FL (ref 81.4–97.8)
PLATELET # BLD AUTO: 244 K/UL (ref 164–446)
PMV BLD AUTO: 9.9 FL (ref 9–12.9)
RBC # BLD AUTO: 5.37 M/UL (ref 4.7–6.1)
WBC # BLD AUTO: 8.5 K/UL (ref 4.8–10.8)

## 2021-04-02 PROCEDURE — 84402 ASSAY OF FREE TESTOSTERONE: CPT

## 2021-04-02 PROCEDURE — 82570 ASSAY OF URINE CREATININE: CPT

## 2021-04-02 PROCEDURE — 85027 COMPLETE CBC AUTOMATED: CPT

## 2021-04-02 PROCEDURE — 83036 HEMOGLOBIN GLYCOSYLATED A1C: CPT | Mod: GA

## 2021-04-02 PROCEDURE — 85007 BL SMEAR W/DIFF WBC COUNT: CPT

## 2021-04-02 PROCEDURE — 84403 ASSAY OF TOTAL TESTOSTERONE: CPT

## 2021-04-02 PROCEDURE — 82043 UR ALBUMIN QUANTITATIVE: CPT

## 2021-04-02 PROCEDURE — 80061 LIPID PANEL: CPT

## 2021-04-02 PROCEDURE — 80500 HCHG CLINICAL PATH CONSULT-LIMITED: CPT | Mod: XU

## 2021-04-02 PROCEDURE — 36415 COLL VENOUS BLD VENIPUNCTURE: CPT

## 2021-04-02 PROCEDURE — 84270 ASSAY OF SEX HORMONE GLOBUL: CPT

## 2021-04-03 LAB
BASOPHILS # BLD AUTO: 1.7 % (ref 0–1.8)
BASOPHILS # BLD: 0.14 K/UL (ref 0–0.12)
CHOLEST SERPL-MCNC: 157 MG/DL (ref 100–199)
CREAT UR-MCNC: 50.07 MG/DL
EOSINOPHIL # BLD AUTO: 0 K/UL (ref 0–0.51)
EOSINOPHIL NFR BLD: 0 % (ref 0–6.9)
EST. AVERAGE GLUCOSE BLD GHB EST-MCNC: 123 MG/DL
FASTING STATUS PATIENT QL REPORTED: NORMAL
HBA1C MFR BLD: 5.9 % (ref 4–5.6)
HDLC SERPL-MCNC: 34 MG/DL
LDLC SERPL CALC-MCNC: 103 MG/DL
LYMPHOCYTES # BLD AUTO: 1.52 K/UL (ref 1–4.8)
LYMPHOCYTES NFR BLD: 17.9 % (ref 22–41)
MANUAL DIFF BLD: ABNORMAL
MICROALBUMIN UR-MCNC: <1.2 MG/DL
MICROALBUMIN/CREAT UR: NORMAL MG/G (ref 0–30)
MONOCYTES # BLD AUTO: 0.65 K/UL (ref 0–0.85)
MONOCYTES NFR BLD AUTO: 7.7 % (ref 0–13.4)
NEUTROPHILS # BLD AUTO: 6.18 K/UL (ref 1.82–7.42)
NEUTROPHILS NFR BLD: 72.7 % (ref 44–72)
PATH REV: NORMAL
PATH REV: NORMAL
TRIGL SERPL-MCNC: 101 MG/DL (ref 0–149)

## 2021-04-08 LAB
SHBG SERPL-SCNC: 65 NMOL/L (ref 11–80)
TESTOST FREE SERPL-MCNC: 25.5 PG/ML (ref 47–244)
TESTOST SERPL-MCNC: 228 NG/DL (ref 300–720)
TESTOSTERONE.FREE+WB SERPL-MCNC: 75.7 NG/DL (ref 130–680)

## 2021-06-11 ENCOUNTER — HOSPITAL ENCOUNTER (OUTPATIENT)
Dept: LAB | Facility: MEDICAL CENTER | Age: 66
End: 2021-06-11
Attending: FAMILY MEDICINE
Payer: MEDICARE

## 2021-06-11 PROCEDURE — 84270 ASSAY OF SEX HORMONE GLOBUL: CPT

## 2021-06-11 PROCEDURE — 36415 COLL VENOUS BLD VENIPUNCTURE: CPT

## 2021-06-11 PROCEDURE — 84403 ASSAY OF TOTAL TESTOSTERONE: CPT

## 2021-06-11 PROCEDURE — 84402 ASSAY OF FREE TESTOSTERONE: CPT

## 2021-06-14 LAB
SHBG SERPL-SCNC: 69 NMOL/L (ref 11–80)
TESTOST FREE MFR SERPL: 1 % (ref 1.6–2.9)
TESTOST FREE SERPL-MCNC: 6 PG/ML (ref 47–244)
TESTOST SERPL-MCNC: 60 NG/DL (ref 300–720)

## 2021-09-21 ENCOUNTER — APPOINTMENT (OUTPATIENT)
Dept: RADIOLOGY | Facility: IMAGING CENTER | Age: 66
End: 2021-09-21
Attending: NURSE PRACTITIONER
Payer: MEDICARE

## 2021-09-21 ENCOUNTER — OFFICE VISIT (OUTPATIENT)
Dept: URGENT CARE | Facility: PHYSICIAN GROUP | Age: 66
End: 2021-09-21
Payer: MEDICARE

## 2021-09-21 VITALS
BODY MASS INDEX: 30.48 KG/M2 | HEART RATE: 65 BPM | TEMPERATURE: 97.9 F | RESPIRATION RATE: 16 BRPM | HEIGHT: 72 IN | SYSTOLIC BLOOD PRESSURE: 102 MMHG | DIASTOLIC BLOOD PRESSURE: 52 MMHG | OXYGEN SATURATION: 99 % | WEIGHT: 225 LBS
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
## 2021-09-21 DIAGNOSIS — M25.521 RIGHT ELBOW PAIN: ICD-10-CM

## 2021-09-21 DIAGNOSIS — S52.121A CLOSED DISPLACED FRACTURE OF HEAD OF RIGHT RADIUS, INITIAL ENCOUNTER: ICD-10-CM

## 2021-09-21 PROBLEM — E29.1 HYPOGONADISM IN MALE: Status: ACTIVE | Noted: 2020-12-09

## 2021-09-21 PROBLEM — N52.9 ERECTILE DYSFUNCTION: Status: ACTIVE | Noted: 2021-03-10

## 2021-09-21 PROBLEM — E78.5 HYPERLIPIDEMIA: Status: ACTIVE | Noted: 2021-03-10

## 2021-09-21 PROBLEM — I10 ESSENTIAL HYPERTENSION: Status: ACTIVE | Noted: 2020-12-09

## 2021-09-21 PROBLEM — K58.2 IRRITABLE BOWEL SYNDROME WITH BOTH CONSTIPATION AND DIARRHEA: Status: ACTIVE | Noted: 2020-12-09

## 2021-09-21 PROBLEM — Z79.4 LONG TERM CURRENT USE OF INSULIN (HCC): Status: ACTIVE | Noted: 2021-03-10

## 2021-09-21 PROBLEM — E11.42 DIABETIC POLYNEUROPATHY ASSOCIATED WITH TYPE 2 DIABETES MELLITUS (HCC): Status: ACTIVE | Noted: 2021-03-10

## 2021-09-21 PROBLEM — M96.1 FAILED BACK SURGICAL SYNDROME: Status: ACTIVE | Noted: 2021-03-10

## 2021-09-21 PROBLEM — M81.0 OSTEOPOROSIS: Status: ACTIVE | Noted: 2021-03-10

## 2021-09-21 PROBLEM — G25.81 RESTLESS LEG SYNDROME: Status: ACTIVE | Noted: 2021-03-10

## 2021-09-21 PROCEDURE — 29075 APPL CST ELBW FNGR SHORT ARM: CPT | Performed by: NURSE PRACTITIONER

## 2021-09-21 PROCEDURE — 73080 X-RAY EXAM OF ELBOW: CPT | Mod: TC,FY,RT | Performed by: NURSE PRACTITIONER

## 2021-09-21 PROCEDURE — 99214 OFFICE O/P EST MOD 30 MIN: CPT | Mod: 25 | Performed by: NURSE PRACTITIONER

## 2021-09-21 RX ORDER — PREGABALIN 50 MG/1
CAPSULE ORAL
COMMUNITY
End: 2023-01-25

## 2021-09-21 RX ORDER — TESTOSTERONE CYPIONATE 200 MG/ML
INJECTION, SOLUTION INTRAMUSCULAR
COMMUNITY
Start: 2021-08-18

## 2021-09-21 RX ORDER — HYDROMORPHONE HYDROCHLORIDE 2 MG/1
TABLET ORAL
COMMUNITY
End: 2023-01-25

## 2021-09-21 RX ORDER — LUBIPROSTONE 8 UG/1
CAPSULE ORAL
COMMUNITY
End: 2023-01-25

## 2021-09-21 RX ORDER — OXYCODONE HCL 80 MG/1
TABLET, FILM COATED, EXTENDED RELEASE ORAL
COMMUNITY
End: 2023-01-25

## 2021-09-21 RX ORDER — TIZANIDINE 4 MG/1
TABLET ORAL
COMMUNITY
End: 2023-01-25

## 2021-09-21 RX ORDER — PEN NEEDLE, DIABETIC 32GX 5/32"
NEEDLE, DISPOSABLE MISCELLANEOUS
COMMUNITY
Start: 2021-08-19 | End: 2023-01-25

## 2021-09-21 RX ORDER — OXYCODONE HYDROCHLORIDE AND ACETAMINOPHEN 5; 325 MG/1; MG/1
TABLET ORAL
COMMUNITY
End: 2023-01-25

## 2021-09-21 RX ORDER — HYDROCODONE BITARTRATE AND ACETAMINOPHEN 10; 325 MG/1; MG/1
TABLET ORAL
COMMUNITY
End: 2023-01-25

## 2021-09-21 RX ORDER — CLONAZEPAM 1 MG/1
TABLET ORAL
COMMUNITY

## 2021-09-21 RX ORDER — BUPRENORPHINE AND NALOXONE 4; 1 MG/1; MG/1
FILM, SOLUBLE BUCCAL; SUBLINGUAL
COMMUNITY
End: 2023-01-25

## 2021-09-21 RX ORDER — INSULIN GLARGINE 100 [IU]/ML
10 INJECTION, SOLUTION SUBCUTANEOUS DAILY
COMMUNITY
Start: 2021-05-19 | End: 2023-01-25

## 2021-09-21 RX ORDER — OXYMORPHONE HYDROCHLORIDE 30 MG/1
TABLET, FILM COATED, EXTENDED RELEASE ORAL
COMMUNITY
End: 2023-01-25

## 2021-09-21 RX ORDER — LOSARTAN POTASSIUM AND HYDROCHLOROTHIAZIDE 12.5; 5 MG/1; MG/1
TABLET ORAL
COMMUNITY
End: 2023-01-25

## 2021-09-21 ASSESSMENT — ENCOUNTER SYMPTOMS
CONSTITUTIONAL NEGATIVE: 1
FALLS: 1

## 2021-09-21 ASSESSMENT — VISUAL ACUITY: OU: 1

## 2021-09-21 ASSESSMENT — FIBROSIS 4 INDEX: FIB4 SCORE: 1.2

## 2021-09-21 NOTE — PATIENT INSTRUCTIONS
Radial Head Fracture    A radial head fracture is a break in the smaller bone in your forearm (radius) near the end of the bone at the elbow joint. There are two bones in your forearm. The radius, or radial bone, is the bone on the side of your thumb.  There are different types of radial head fractures. The type is determined by the amount of movement (displacement) of bones from their normal positions:  · Type 1. This is a small fracture in which the bone pieces remain together (nondisplaced fracture).  · Type 2. The fracture is moderate, and bone pieces are slightly displaced.  · Type 3. There are multiple fractures and displaced bone pieces.  What are the causes?  This condition is usually caused by falling and landing on an outstretched arm.  What increases the risk?  You are more likely to develop this condition if you:  · Are female.  · Are 30-40 years old.  · Have weak bones or osteoporosis.  What are the signs or symptoms?  Symptoms of this condition include:  · Swelling of the elbow joint.  · Pain and difficulty when moving the elbow joint.  How is this diagnosed?  This condition is diagnosed based on your medical history and a physical exam. You may have X-rays to confirm the type of fracture.  How is this treated?  Treatment for this condition includes resting, icing, and raising (elevating) the injured area above the level of your heart. You may be given medicines to help relieve pain.  Treatment varies depending on the type of fracture you have.  · For a type 1 fracture, you may be given a splint or sling to keep your arm and elbow from moving for several days.  · For a type 2 fracture, you may be given a splint or sling to keep your arm and elbow from moving for several days. If the displacement is more severe, you may need surgery.  · For a type 3 fracture, you will usually need surgery to have bone pieces removed. The entire radial head may need to be removed if the damage is severe.  Follow these  instructions at home:  Medicines  · Take over-the-counter and prescription medicines only as told by your health care provider.  · Ask your health care provider if the medicine prescribed to you:  ? Requires you to avoid driving or using heavy machinery.  ? Can cause constipation. You may need to take these actions to prevent or treat constipation:  § Drink enough fluid to keep your urine pale yellow.  § Take over-the-counter or prescription medicines.  § Eat foods that are high in fiber, such as beans, whole grains, and fresh fruits and vegetables.  § Limit foods that are high in fat and processed sugars, such as fried or sweet foods.  If you have a splint or sling:  · Wear the splint or sling as told by your health care provider. Remove it only as told by your health care provider.  · Loosen it if your fingers tingle, become numb, or turn cold and blue.  · Keep it clean and dry.  If you have a splint:  · Do not put pressure on any part of the splint until it is fully hardened. This may take several hours.  · Check the skin around it every day. Tell your health care provider about any concerns.  Bathing  · Do not take baths, swim, or use a hot tub until your health care provider approves. Ask your health care provider if you may take showers. You may only be allowed to take sponge baths.  · If the splint or sling is not waterproof:  ? Do not let it get wet.  ? Cover it with a watertight covering when you take a bath or shower.  Managing pain, stiffness, and swelling    · If directed, put ice on the injured area.  ? If you have a removable splint or sling, remove it as told by your health care provider.  ? Put ice in a plastic bag.  ? Place a towel between your skin and the bag.  ? Leave the ice on for 20 minutes, 2-3 times a day.  · Move your fingers often to reduce stiffness and swelling.  · Raise (elevate) the injured area above the level of your heart while you are sitting or lying down.  Activity  · Ask your  health care provider when it is safe to drive if you have a splint or sling on your arm.  · Do not lift anything that is heavier than 10 lb (4.5 kg), or the limit that you are told, until your health care provider says that it is safe.  · Return to your normal activities as told by your health care provider. Ask your health care provider what activities are safe for you.  · Do exercises as told by your health care provider or physical therapist.  General instructions  · Do not use any products that contain nicotine or tobacco, such as cigarettes, e-cigarettes, and chewing tobacco. These can delay bone healing. If you need help quitting, ask your health care provider.  · Keep all follow-up visits as told by your health care provider. This is important.  Contact a health care provider if:  · You have problems with your splint.  · You have pain or swelling that gets worse.  Get help right away if:  · You have severe pain.  · You have fluid or a bad smell coming from your splint.  · Your hand or fingers get cold or turn pale or blue.  · You lose feeling in any part of your hand or arm.  Summary  · A radial head fracture is a break in the smaller bone in your forearm (radius) near the end of the bone at the elbow joint.  · This condition usually happens because of an injury, such as falling on an outstretched arm.  · This condition may be treated with rest, ice, elevation, pain medicines, a splint or sling, and surgery. Treatment will vary depending on the type of fracture you have.  This information is not intended to replace advice given to you by your health care provider. Make sure you discuss any questions you have with your health care provider.  Document Released: 10/09/2007 Document Revised: 12/26/2019 Document Reviewed: 12/26/2019  Elsevier Patient Education © 2020 [employer]      A referral request has been placed for orthopedics. We have a referrals department that is tasked with locating a suitable office  that currently accepts patients and your insurance and you should be receiving referral information from them such as the office name, address, and number. This process usually takes around 3 business days. If you are not contacted by our referrals department, please call [phone].

## 2021-09-21 NOTE — PROGRESS NOTES
radSubjective:     Michael Caceres III is a 66 y.o. male who presents for Elbow Pain (fell a couple hours ago on R elblow )       Other  This is a new problem. The problem has been gradually worsening. Associated symptoms include a rash.     Patient reports about 2 hours ago, he had a fall onto his outstretched right hand.  Has pain, tenderness, and limited range of motion at the right elbow.  Has a mild abrasion at the left elbow.    Patient was screened prior to rooming and denied COVID-19 diagnosis or contact with a person who has been diagnosed or is suspected to have COVID-19. During this visit, appropriate PPE was worn, hand hygiene was performed, and the patient and any visitors were masked.     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    MEDS:   Current Outpatient Medications:   •  docusate sodium (COLACE) 100 MG Cap, Take 1 Cap by mouth 2 times a day., Disp: 60 Cap, Rfl: 0  •  mupirocin (BACTROBAN) 2 % Ointment, Apply 1 Tube to affected area(s) 3 times a day., Disp: 1 Tube, Rfl: 0  •  acetaminophen (TYLENOL) 500 MG Tab, Take 1-2 Tabs by mouth every 6 hours., Disp: 30 Tab, Rfl: 0  •  insulin regular (HUMULIN R) 100 Unit/mL Solution, Inject 1-6 Units as instructed 4 Times a Day,Before Meals and at Bedtime., Disp: , Rfl:   •  glucose 4 g 4 g Chew Tab, Take 4 Tabs by mouth as needed for Low Blood Sugar (If FSBG is less than or equal to 70 mg/dL and patient able to eat or drink)., Disp: , Rfl:   •  dextrose 50% (D50W) 50 % Solution, 25 mL by Intravenous route as needed (If FSBG is less than or equal to 70 mg/dL and If patient is NPO)., Disp: , Rfl: 0  •  sitagliptin (JANUVIA)  50 MG Tab, Take 1 Tab by mouth 2 Times a Day., Disp: 30 Tab, Rfl: 3  •  docusate sodium 100 MG Cap, Take 100 mg by mouth 2 Times a Day., Disp: , Rfl:   •  gabapentin (NEURONTIN) 800 MG tablet, Take 800 mg by mouth 3 times a day., Disp: , Rfl:   •  sitagliptan-metformin (JANUMET)  MG per tablet, Take 1 Tab by mouth 2 times a day, with meals., Disp: , Rfl:   •  NIACIN PO, Take 1 Tab by mouth every day., Disp: , Rfl:   •  Cholecalciferol 400 UNIT Cap, Take 800 Units by mouth every day., Disp: , Rfl:   •  Calcium-Magnesium-Vitamin D (CALCIUM MAGNESIUM PO), Take 1 Tab by mouth every day., Disp: , Rfl:   •  Multiple Vitamins-Minerals (MULTIVITAMIN MEN 50+ PO), Take 1 Tab by mouth every day., Disp: , Rfl:   •  duloxetine (CYMBALTA) 60 MG Cap DR Particles delayed-release capsule, Take 90 mg by mouth every day., Disp: , Rfl:   •  hydrochlorothiazide (HYDRODIURIL) 25 MG Tab, Take 12.5 mg by mouth every day., Disp: , Rfl:   •  omeprazole (PRILOSEC) 20 MG delayed-release capsule, Take 40 mg by mouth as needed., Disp: , Rfl:   •  Verapamil HCl  MG CAPSULE SR 24 HR, Take  by mouth., Disp: , Rfl:     ALLERGIES: No Known Allergies    SURGHX:   Past Surgical History:   Procedure Laterality Date   • HIP REVISION TOTAL Right 4/9/2019    Procedure: REVISION, TOTAL ARTHROPLASTY, HIP-  AND OTHER INDICATED PROCEDURES;  Surgeon: Shawn Chaudhari M.D.;  Location: [facility];  Service: Orthopedics   • HIP ARTHROPLASTY TOTAL Right 11/14/2017    Procedure: HIP ARTHROPLASTY TOTAL;  Surgeon: Shawn Chaudhari M.D.;  Location: [facility];  Service: Orthopedics   • FEMUR ORIF Left 11/30/2016    Procedure: FEMUR OPEN REDUCTION INTERNAL FIXATION ;  Surgeon: Shawn hCaudhari M.D.;  Location: [facility];  Service:    • HIP ARTHROPLASTY TOTAL Left 2007   • BOWEL RESECTION      several times   • FACIAL FRACTURE ORIF     • HARDWARE REMOVAL SPINE     • HERNIA REPAIR      abdominal with mesh   • HERNIA  REPAIR      x 8   • LUMBAR FUSION ANTERIOR     • LUMBAR LAMINECTOMY DISKECTOMY      multiple surgeries 199, 2000, 2001 and 2002     SOCHX:  reports that he quit smoking about 3 years ago. His smoking use included cigarettes. He smoked 1.00 pack per day. He has never used smokeless tobacco. He reports that he does not drink alcohol and does not use drugs.     FH: Reviewed with patient, not pertinent to this visit.    Review of Systems   Constitutional: Negative.    Musculoskeletal: Positive for falls.        Right elbow pain, tenderness, limited range of motion   Skin: Positive for rash.   All other systems reviewed and are negative.    Additional details per HPI.      Objective:     /52   Pulse 65   Temp 36.6 °C (97.9 °F) (Temporal)   Resp 16   Ht 1.829 m (6')   Wt 102 kg (225 lb)   SpO2 99%   BMI 30.52 kg/m²     Physical Exam  Vitals reviewed.   Constitutional:       General: He is not in acute distress.     Appearance: He is well-developed. He is not ill-appearing or toxic-appearing.   HENT:      Head: Normocephalic.      Right Ear: External ear normal.      Left Ear: External ear normal.   Eyes:      General: Vision grossly intact.      Extraocular Movements: Extraocular movements intact.      Conjunctiva/sclera: Conjunctivae normal.   Cardiovascular:      Rate and Rhythm: Normal rate.      Pulses: Normal pulses.   Pulmonary:      Effort: Pulmonary effort is normal. No respiratory distress.   Musculoskeletal:         General: No deformity.      Right shoulder: No swelling, deformity or tenderness. Normal range of motion.      Right upper arm: No swelling, deformity or tenderness.      Right elbow: Swelling (Mild bruising over radial head) present. No deformity or lacerations. Decreased range of motion. Tenderness present in radial head.      Left elbow: Laceration (Superficial abrasion) present. No swelling or deformity. Normal range of motion. No tenderness.      Right forearm: No swelling,  deformity, lacerations or tenderness.      Right wrist: No swelling, deformity, lacerations or tenderness. Normal range of motion.      Right hand: No swelling, deformity, lacerations or tenderness. Normal range of motion. Normal capillary refill.      Cervical back: Normal range of motion.   Skin:     General: Skin is warm and dry.      Coloration: Skin is not pale.      Findings: Rash present.   Neurological:      Mental Status: He is alert and oriented to person, place, and time.      Sensory: No sensory deficit.      Motor: No weakness.      Coordination: Coordination normal.   Psychiatric:         Behavior: Behavior normal. Behavior is cooperative.     X-ray of right elbow:    Details    Reading Physician Reading Date Result Priority   Steven Alcaraz M.D.  [phone] 9/21/2021 Urgent Care   Narrative & Impression     9/21/2021 4:57 PM     HISTORY/REASON FOR EXAM:  Pain/Deformity Following Trauma  Ground-level fall.     TECHNIQUE/EXAM DESCRIPTION AND NUMBER OF VIEWS:  3 views of the RIGHT elbow.     COMPARISON: None     FINDINGS:     Acute mildly displaced fracture of the radial head.  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  Mild osteoarthritis.  Moderate elbow joint effusion.     IMPRESSION:     Acute mildly displaced fracture of the radial head.        Exam Ended: 09/21/21  5:10 PM Last Resulted: 09/21/21  5:17 PM        Radiology report and images reviewed by myself. Concur with findings.      Assessment/Plan:     1. Fall, initial encounter  - DX-ELBOW-COMPLETE 3+ RIGHT; Future  - REFERRAL TO ORTHOPEDICS    2. Right elbow pain  - DX-ELBOW-COMPLETE 3+ RIGHT; Future  - REFERRAL TO ORTHOPEDICS    3. Closed displaced fracture of head of right radius, initial encounter  - REFERRAL TO ORTHOPEDICS    Right long arm Ortho-Glass splint/cast spanning from the posterior right elbow to the midpalmar crease applied by myself.  Padding applied prior to placement.  Secured with elastic bandage.  Checked for  proper fit.  CMS intact.     Rest, ice, compression, and elevation (RICE) and over-the-counter acetaminophen alternating with ibuprofen, per 's instructions, as needed for pain.  Sling applied.    Patient declines tetanus booster at this time.    Referral placed for orthopedics. Patient reports he has one in mind who like to consult. Referral order printed.    Differential diagnosis, natural history, supportive care, over-the-counter symptom management per 's instructions, close monitoring, and indications for immediate follow-up discussed.     All questions answered. Patient agrees with the plan of care.    Discharge summary provided.     Billing note: 30 minutes was allotted and spent for patient care and coordination of care (not reported separately) including preparing for the visit, obtaining/reviewing history, performing an exam/evaluation, ordering imaging/referral, developing a plan of care, counseling/educating the patient, developing/printing/going over the discharge summary with the patient, updating the medical record, reconciling outside information, independent interpretation, and documentation. Care specific to this encounter was summarized here. Please refer to the chart for additional details on the care provided.

## 2021-09-22 ENCOUNTER — TELEPHONE (OUTPATIENT)
Dept: URGENT CARE | Facility: PHYSICIAN GROUP | Age: 66
End: 2021-09-22
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
## 2021-09-22 DIAGNOSIS — S52.121A CLOSED DISPLACED FRACTURE OF HEAD OF RIGHT RADIUS, INITIAL ENCOUNTER: ICD-10-CM

## 2021-09-22 DIAGNOSIS — M25.521 RIGHT ELBOW PAIN: ICD-10-CM

## 2021-09-22 RX ORDER — HYDROCODONE BITARTRATE AND ACETAMINOPHEN 5; 325 MG/1; MG/1
1 TABLET ORAL EVERY 8 HOURS PRN
Qty: 15 TABLET | Refills: 0 | Status: SHIPPED | OUTPATIENT
Start: 2021-09-22 | End: 2021-09-27

## 2021-09-22 NOTE — TELEPHONE ENCOUNTER
Patients wife francisco came into the office and states that brittani is having right elbow pain. She states that he was offered pain medication yesterday and he declined it. He is now looking for some relief. She would like to know if you could please order something and have it sent to [facility] in [place].      WifeWalter singh  phone number is; [phone]

## 2021-09-22 NOTE — TELEPHONE ENCOUNTER
Spoke with patient and wife over the phone.  Patient has been using OTC ibuprofen with no improvement in the symptoms.  Reporting severe pain.  Has tolerated Norco/Vicodin in the past.  Risks/benefits of controlled substances discussed with patient.  Patient verbalizes understanding.  Verbal consent received from patient for short term prescription of controlled substance for adequate control of pain.  Rx sent electronically to pharmacy.    PDMP and Opioid Risk Tool show low risk of controlled substance abuse for this patient. Patient counseled on risks and benefits of controlled substances. Verbal consent received for short-term prescription of controlled substance for adequate control of pain.

## 2021-12-01 ENCOUNTER — HOSPITAL ENCOUNTER (OUTPATIENT)
Dept: LAB | Facility: MEDICAL CENTER | Age: 66
End: 2021-12-01
Attending: FAMILY MEDICINE
Payer: MEDICARE

## 2021-12-01 PROCEDURE — 36415 COLL VENOUS BLD VENIPUNCTURE: CPT

## 2021-12-01 PROCEDURE — 84270 ASSAY OF SEX HORMONE GLOBUL: CPT

## 2021-12-01 PROCEDURE — 84403 ASSAY OF TOTAL TESTOSTERONE: CPT

## 2021-12-01 PROCEDURE — 84402 ASSAY OF FREE TESTOSTERONE: CPT

## 2021-12-04 LAB
SHBG SERPL-SCNC: 45 NMOL/L (ref 11–80)
TESTOST FREE MFR SERPL: 1.7 % (ref 1.6–2.9)
TESTOST FREE SERPL-MCNC: 113 PG/ML (ref 47–244)
TESTOST SERPL-MCNC: 680 NG/DL (ref 300–720)

## 2022-02-09 ENCOUNTER — HOSPITAL ENCOUNTER (OUTPATIENT)
Dept: LAB | Facility: MEDICAL CENTER | Age: 67
End: 2022-02-09
Attending: INTERNAL MEDICINE
Payer: MEDICARE

## 2022-02-09 LAB
ALBUMIN SERPL BCP-MCNC: 4.4 G/DL (ref 3.2–4.9)
ALBUMIN/GLOB SERPL: 1.9 G/DL
ALP SERPL-CCNC: 62 U/L (ref 30–99)
ALT SERPL-CCNC: 17 U/L (ref 2–50)
ANION GAP SERPL CALC-SCNC: 11 MMOL/L (ref 7–16)
AST SERPL-CCNC: 19 U/L (ref 12–45)
BILIRUB SERPL-MCNC: 0.5 MG/DL (ref 0.1–1.5)
BUN SERPL-MCNC: 15 MG/DL (ref 8–22)
CALCIUM SERPL-MCNC: 9.8 MG/DL (ref 8.5–10.5)
CHLORIDE SERPL-SCNC: 102 MMOL/L (ref 96–112)
CHOLEST SERPL-MCNC: 131 MG/DL (ref 100–199)
CO2 SERPL-SCNC: 25 MMOL/L (ref 20–33)
CREAT SERPL-MCNC: 0.59 MG/DL (ref 0.5–1.4)
CREAT UR-MCNC: 70.25 MG/DL
EST. AVERAGE GLUCOSE BLD GHB EST-MCNC: 151 MG/DL
FASTING STATUS PATIENT QL REPORTED: NORMAL
GLOBULIN SER CALC-MCNC: 2.3 G/DL (ref 1.9–3.5)
GLUCOSE SERPL-MCNC: 175 MG/DL (ref 65–99)
HBA1C MFR BLD: 6.9 % (ref 4–5.6)
HDLC SERPL-MCNC: 29 MG/DL
HGB BLD-MCNC: 15.7 G/DL (ref 14–18)
LDLC SERPL CALC-MCNC: 88 MG/DL
MICROALBUMIN UR-MCNC: <1.2 MG/DL
MICROALBUMIN/CREAT UR: NORMAL MG/G (ref 0–30)
POTASSIUM SERPL-SCNC: 4.3 MMOL/L (ref 3.6–5.5)
PROT SERPL-MCNC: 6.7 G/DL (ref 6–8.2)
SODIUM SERPL-SCNC: 138 MMOL/L (ref 135–145)
TRIGL SERPL-MCNC: 71 MG/DL (ref 0–149)

## 2022-02-09 PROCEDURE — 82043 UR ALBUMIN QUANTITATIVE: CPT

## 2022-02-09 PROCEDURE — 36415 COLL VENOUS BLD VENIPUNCTURE: CPT

## 2022-02-09 PROCEDURE — 82570 ASSAY OF URINE CREATININE: CPT

## 2022-02-09 PROCEDURE — 80061 LIPID PANEL: CPT

## 2022-02-09 PROCEDURE — 83036 HEMOGLOBIN GLYCOSYLATED A1C: CPT | Mod: GA

## 2022-02-09 PROCEDURE — 85018 HEMOGLOBIN: CPT

## 2022-02-09 PROCEDURE — 80053 COMPREHEN METABOLIC PANEL: CPT

## 2022-03-09 ENCOUNTER — HOSPITAL ENCOUNTER (OUTPATIENT)
Dept: LAB | Facility: MEDICAL CENTER | Age: 67
End: 2022-03-09
Attending: FAMILY MEDICINE
Payer: MEDICARE

## 2022-03-09 LAB
ALBUMIN SERPL BCP-MCNC: 4.6 G/DL (ref 3.2–4.9)
ALBUMIN/GLOB SERPL: 2.3 G/DL
ALP SERPL-CCNC: 70 U/L (ref 30–99)
ALT SERPL-CCNC: 20 U/L (ref 2–50)
ANION GAP SERPL CALC-SCNC: 11 MMOL/L (ref 7–16)
AST SERPL-CCNC: 20 U/L (ref 12–45)
BASOPHILS # BLD AUTO: 0.3 % (ref 0–1.8)
BASOPHILS # BLD: 0.03 K/UL (ref 0–0.12)
BILIRUB SERPL-MCNC: 0.4 MG/DL (ref 0.1–1.5)
BUN SERPL-MCNC: 13 MG/DL (ref 8–22)
CALCIUM SERPL-MCNC: 10.2 MG/DL (ref 8.5–10.5)
CHLORIDE SERPL-SCNC: 102 MMOL/L (ref 96–112)
CHOLEST SERPL-MCNC: 144 MG/DL (ref 100–199)
CO2 SERPL-SCNC: 25 MMOL/L (ref 20–33)
CREAT SERPL-MCNC: 0.55 MG/DL (ref 0.5–1.4)
EOSINOPHIL # BLD AUTO: 0.08 K/UL (ref 0–0.51)
EOSINOPHIL NFR BLD: 0.9 % (ref 0–6.9)
ERYTHROCYTE [DISTWIDTH] IN BLOOD BY AUTOMATED COUNT: 51.6 FL (ref 35.9–50)
FASTING STATUS PATIENT QL REPORTED: NORMAL
GLOBULIN SER CALC-MCNC: 2 G/DL (ref 1.9–3.5)
GLUCOSE SERPL-MCNC: 177 MG/DL (ref 65–99)
HCT VFR BLD AUTO: 48.4 % (ref 42–52)
HDLC SERPL-MCNC: 32 MG/DL
HGB BLD-MCNC: 16.7 G/DL (ref 14–18)
IMM GRANULOCYTES # BLD AUTO: 0.03 K/UL (ref 0–0.11)
IMM GRANULOCYTES NFR BLD AUTO: 0.3 % (ref 0–0.9)
LDLC SERPL CALC-MCNC: 90 MG/DL
LYMPHOCYTES # BLD AUTO: 1 K/UL (ref 1–4.8)
LYMPHOCYTES NFR BLD: 10.8 % (ref 22–41)
MCH RBC QN AUTO: 32.4 PG (ref 27–33)
MCHC RBC AUTO-ENTMCNC: 34.5 G/DL (ref 33.7–35.3)
MCV RBC AUTO: 94 FL (ref 81.4–97.8)
MONOCYTES # BLD AUTO: 0.43 K/UL (ref 0–0.85)
MONOCYTES NFR BLD AUTO: 4.7 % (ref 0–13.4)
NEUTROPHILS # BLD AUTO: 7.67 K/UL (ref 1.82–7.42)
NEUTROPHILS NFR BLD: 83 % (ref 44–72)
NRBC # BLD AUTO: 0 K/UL
NRBC BLD-RTO: 0 /100 WBC
PLATELET # BLD AUTO: 250 K/UL (ref 164–446)
PMV BLD AUTO: 9.2 FL (ref 9–12.9)
POTASSIUM SERPL-SCNC: 5.6 MMOL/L (ref 3.6–5.5)
PROT SERPL-MCNC: 6.6 G/DL (ref 6–8.2)
PSA SERPL-MCNC: 0.6 NG/ML (ref 0–4)
RBC # BLD AUTO: 5.15 M/UL (ref 4.7–6.1)
SODIUM SERPL-SCNC: 138 MMOL/L (ref 135–145)
TRIGL SERPL-MCNC: 110 MG/DL (ref 0–149)
WBC # BLD AUTO: 9.2 K/UL (ref 4.8–10.8)

## 2022-03-09 PROCEDURE — 84402 ASSAY OF FREE TESTOSTERONE: CPT

## 2022-03-09 PROCEDURE — 84270 ASSAY OF SEX HORMONE GLOBUL: CPT

## 2022-03-09 PROCEDURE — 84153 ASSAY OF PSA TOTAL: CPT | Mod: GA

## 2022-03-09 PROCEDURE — 80061 LIPID PANEL: CPT

## 2022-03-09 PROCEDURE — 84403 ASSAY OF TOTAL TESTOSTERONE: CPT

## 2022-03-09 PROCEDURE — 80053 COMPREHEN METABOLIC PANEL: CPT

## 2022-03-09 PROCEDURE — 36415 COLL VENOUS BLD VENIPUNCTURE: CPT

## 2022-03-09 PROCEDURE — 85025 COMPLETE CBC W/AUTO DIFF WBC: CPT

## 2022-03-14 LAB
SHBG SERPL-SCNC: 56 NMOL/L (ref 19–76)
TESTOST FREE MFR SERPL: 1.3 % (ref 1.6–2.9)
TESTOST FREE SERPL-MCNC: 56 PG/ML (ref 47–244)
TESTOST SERPL-MCNC: 418 NG/DL (ref 300–720)

## 2022-06-21 ENCOUNTER — HOSPITAL ENCOUNTER (OUTPATIENT)
Dept: LAB | Facility: MEDICAL CENTER | Age: 67
End: 2022-06-21
Attending: FAMILY MEDICINE
Payer: MEDICARE

## 2022-06-21 LAB
ANION GAP SERPL CALC-SCNC: 10 MMOL/L (ref 7–16)
BUN SERPL-MCNC: 14 MG/DL (ref 8–22)
CALCIUM SERPL-MCNC: 9.5 MG/DL (ref 8.5–10.5)
CHLORIDE SERPL-SCNC: 102 MMOL/L (ref 96–112)
CO2 SERPL-SCNC: 25 MMOL/L (ref 20–33)
CREAT SERPL-MCNC: 0.65 MG/DL (ref 0.5–1.4)
EST. AVERAGE GLUCOSE BLD GHB EST-MCNC: 151 MG/DL
GFR SERPLBLD CREATININE-BSD FMLA CKD-EPI: 105 ML/MIN/1.73 M 2
GLUCOSE SERPL-MCNC: 152 MG/DL (ref 65–99)
HBA1C MFR BLD: 6.9 % (ref 4–5.6)
POTASSIUM SERPL-SCNC: 4.2 MMOL/L (ref 3.6–5.5)
SODIUM SERPL-SCNC: 137 MMOL/L (ref 135–145)

## 2022-06-21 PROCEDURE — 36415 COLL VENOUS BLD VENIPUNCTURE: CPT | Mod: GA

## 2022-06-21 PROCEDURE — 83036 HEMOGLOBIN GLYCOSYLATED A1C: CPT | Mod: GA

## 2022-06-21 PROCEDURE — 80048 BASIC METABOLIC PNL TOTAL CA: CPT

## 2022-09-21 ENCOUNTER — HOSPITAL ENCOUNTER (OUTPATIENT)
Dept: LAB | Facility: MEDICAL CENTER | Age: 67
End: 2022-09-21
Attending: FAMILY MEDICINE
Payer: MEDICARE

## 2022-09-21 LAB
ALBUMIN SERPL BCP-MCNC: 4.5 G/DL (ref 3.2–4.9)
ALBUMIN/GLOB SERPL: 2.1 G/DL
ALP SERPL-CCNC: 71 U/L (ref 30–99)
ALT SERPL-CCNC: 24 U/L (ref 2–50)
ANION GAP SERPL CALC-SCNC: 9 MMOL/L (ref 7–16)
AST SERPL-CCNC: 24 U/L (ref 12–45)
BASOPHILS # BLD AUTO: 0.8 % (ref 0–1.8)
BASOPHILS # BLD: 0.06 K/UL (ref 0–0.12)
BILIRUB SERPL-MCNC: 0.4 MG/DL (ref 0.1–1.5)
BUN SERPL-MCNC: 17 MG/DL (ref 8–22)
CALCIUM SERPL-MCNC: 9.6 MG/DL (ref 8.5–10.5)
CHLORIDE SERPL-SCNC: 103 MMOL/L (ref 96–112)
CHOLEST SERPL-MCNC: 77 MG/DL (ref 100–199)
CO2 SERPL-SCNC: 27 MMOL/L (ref 20–33)
CREAT SERPL-MCNC: 0.65 MG/DL (ref 0.5–1.4)
EOSINOPHIL # BLD AUTO: 0.05 K/UL (ref 0–0.51)
EOSINOPHIL NFR BLD: 0.7 % (ref 0–6.9)
ERYTHROCYTE [DISTWIDTH] IN BLOOD BY AUTOMATED COUNT: 48.7 FL (ref 35.9–50)
EST. AVERAGE GLUCOSE BLD GHB EST-MCNC: 128 MG/DL
FASTING STATUS PATIENT QL REPORTED: NORMAL
GFR SERPLBLD CREATININE-BSD FMLA CKD-EPI: 104 ML/MIN/1.73 M 2
GLOBULIN SER CALC-MCNC: 2.1 G/DL (ref 1.9–3.5)
GLUCOSE SERPL-MCNC: 152 MG/DL (ref 65–99)
HBA1C MFR BLD: 6.1 % (ref 4–5.6)
HCT VFR BLD AUTO: 47 % (ref 42–52)
HCV AB SER QL: NORMAL
HDLC SERPL-MCNC: 34 MG/DL
HGB BLD-MCNC: 16.2 G/DL (ref 14–18)
HIV 1+2 AB+HIV1 P24 AG SERPL QL IA: NORMAL
IMM GRANULOCYTES # BLD AUTO: 0.02 K/UL (ref 0–0.11)
IMM GRANULOCYTES NFR BLD AUTO: 0.3 % (ref 0–0.9)
LDLC SERPL CALC-MCNC: 33 MG/DL
LYMPHOCYTES # BLD AUTO: 1.08 K/UL (ref 1–4.8)
LYMPHOCYTES NFR BLD: 15.3 % (ref 22–41)
MCH RBC QN AUTO: 33.4 PG (ref 27–33)
MCHC RBC AUTO-ENTMCNC: 34.5 G/DL (ref 33.7–35.3)
MCV RBC AUTO: 96.9 FL (ref 81.4–97.8)
MONOCYTES # BLD AUTO: 0.45 K/UL (ref 0–0.85)
MONOCYTES NFR BLD AUTO: 6.4 % (ref 0–13.4)
NEUTROPHILS # BLD AUTO: 5.42 K/UL (ref 1.82–7.42)
NEUTROPHILS NFR BLD: 76.5 % (ref 44–72)
NRBC # BLD AUTO: 0 K/UL
NRBC BLD-RTO: 0 /100 WBC
PLATELET # BLD AUTO: 259 K/UL (ref 164–446)
PMV BLD AUTO: 9.5 FL (ref 9–12.9)
POTASSIUM SERPL-SCNC: 4.6 MMOL/L (ref 3.6–5.5)
PROT SERPL-MCNC: 6.6 G/DL (ref 6–8.2)
RBC # BLD AUTO: 4.85 M/UL (ref 4.7–6.1)
SODIUM SERPL-SCNC: 139 MMOL/L (ref 135–145)
TRIGL SERPL-MCNC: 48 MG/DL (ref 0–149)
WBC # BLD AUTO: 7.1 K/UL (ref 4.8–10.8)

## 2022-09-21 PROCEDURE — 80061 LIPID PANEL: CPT

## 2022-09-21 PROCEDURE — 80053 COMPREHEN METABOLIC PANEL: CPT

## 2022-09-21 PROCEDURE — G0475 HIV COMBINATION ASSAY: HCPCS | Mod: GA

## 2022-09-21 PROCEDURE — 85025 COMPLETE CBC W/AUTO DIFF WBC: CPT

## 2022-09-21 PROCEDURE — 36415 COLL VENOUS BLD VENIPUNCTURE: CPT

## 2022-09-21 PROCEDURE — 83036 HEMOGLOBIN GLYCOSYLATED A1C: CPT | Mod: GA

## 2022-09-21 PROCEDURE — 86803 HEPATITIS C AB TEST: CPT

## 2023-01-16 ENCOUNTER — HOSPITAL ENCOUNTER (OUTPATIENT)
Dept: LAB | Facility: MEDICAL CENTER | Age: 68
End: 2023-01-16
Attending: FAMILY MEDICINE
Payer: MEDICARE

## 2023-01-16 LAB
CREAT UR-MCNC: 45.67 MG/DL
EST. AVERAGE GLUCOSE BLD GHB EST-MCNC: 157 MG/DL
HBA1C MFR BLD: 7.1 % (ref 4–5.6)
MICROALBUMIN UR-MCNC: <1.2 MG/DL
MICROALBUMIN/CREAT UR: NORMAL MG/G (ref 0–30)
PSA SERPL-MCNC: 0.66 NG/ML (ref 0–4)
TSH SERPL DL<=0.005 MIU/L-ACNC: 1.38 UIU/ML (ref 0.38–5.33)

## 2023-01-16 PROCEDURE — 83036 HEMOGLOBIN GLYCOSYLATED A1C: CPT | Mod: GA

## 2023-01-16 PROCEDURE — 84443 ASSAY THYROID STIM HORMONE: CPT

## 2023-01-16 PROCEDURE — 84402 ASSAY OF FREE TESTOSTERONE: CPT

## 2023-01-16 PROCEDURE — 36415 COLL VENOUS BLD VENIPUNCTURE: CPT

## 2023-01-16 PROCEDURE — 84403 ASSAY OF TOTAL TESTOSTERONE: CPT

## 2023-01-16 PROCEDURE — 84270 ASSAY OF SEX HORMONE GLOBUL: CPT

## 2023-01-16 PROCEDURE — 82570 ASSAY OF URINE CREATININE: CPT

## 2023-01-16 PROCEDURE — 82043 UR ALBUMIN QUANTITATIVE: CPT

## 2023-01-16 PROCEDURE — 84153 ASSAY OF PSA TOTAL: CPT | Mod: GA

## 2023-01-18 LAB
SHBG SERPL-SCNC: 42 NMOL/L (ref 19–76)
TESTOST FREE MFR SERPL: 1.6 % (ref 1.6–2.9)
TESTOST FREE SERPL-MCNC: 60 PG/ML (ref 47–244)
TESTOST SERPL-MCNC: 374 NG/DL (ref 300–720)

## 2023-01-25 ENCOUNTER — PRE-ADMISSION TESTING (OUTPATIENT)
Dept: ADMISSIONS | Facility: MEDICAL CENTER | Age: 68
End: 2023-01-25
Attending: ORTHOPAEDIC SURGERY
Payer: MEDICARE

## 2023-01-25 DIAGNOSIS — Z01.810 PRE-OPERATIVE CARDIOVASCULAR EXAMINATION: ICD-10-CM

## 2023-01-25 DIAGNOSIS — Z01.812 PRE-OPERATIVE LABORATORY EXAMINATION: ICD-10-CM

## 2023-01-25 RX ORDER — HYDROCHLOROTHIAZIDE 12.5 MG/1
12.5 CAPSULE, GELATIN COATED ORAL EVERY MORNING
COMMUNITY

## 2023-01-25 RX ORDER — GLIMEPIRIDE 1 MG/1
1 TABLET ORAL EVERY MORNING
COMMUNITY

## 2023-01-25 RX ORDER — LOSARTAN POTASSIUM 50 MG/1
50 TABLET ORAL EVERY MORNING
COMMUNITY

## 2023-01-25 RX ORDER — OMEPRAZOLE 40 MG/1
40 CAPSULE, DELAYED RELEASE ORAL EVERY MORNING
COMMUNITY

## 2023-01-25 RX ORDER — LUBIPROSTONE 24 UG/1
24 CAPSULE ORAL EVERY EVENING
COMMUNITY

## 2023-01-25 RX ORDER — BUPRENORPHINE AND NALOXONE 2; .5 MG/1; MG/1
FILM, SOLUBLE BUCCAL; SUBLINGUAL EVERY MORNING
COMMUNITY
Start: 2023-01-03

## 2023-01-25 RX ORDER — PREGABALIN 150 MG/1
150 CAPSULE ORAL 3 TIMES DAILY
COMMUNITY

## 2023-01-25 RX ORDER — ATORVASTATIN CALCIUM 10 MG/1
10 TABLET, FILM COATED ORAL NIGHTLY
COMMUNITY

## 2023-01-25 NOTE — PREPROCEDURE INSTRUCTIONS
"Preadmit appointment: \" Preparing for your Procedure information\" sheet reviewed with patient with verbal and written instructions- emailed. Patient instructed to continue prescribed medications through the day before surgery, instructed to take the following medications the day of surgery per anesthesia protocol: tylenol if needed, Clonazepam, docusate sodium, Omeprazole, Pregabalin.  Pt reports surgery in the am so instructed to hold diabetic medication.  Pt advised if surgery moved to the afternoon he would need to contact his MD that manages his diabetes for further instructions on his diabetic medication prior to surgery.  Pt advised to take his prescribed pain medication day of surgery.  Pt instructed to contact his pain specialist which he states is Dr. Henson for further instructions regarding his Suboxone and surgery, pt concerned he would have to go without pain medication if he were told to hold Suboxone, advised pt to discuss this with Dr. Henson and Dr. Ballard and he said he would.   Verbal and written instructions on covid symptoms to watch for given to patient and patient instructed to call MD if any symptoms develop prior to surgery/procedure.  Pt advised to discuss previous anesthesia issue with anesthesia day of surgery.  METS >4.  Dr Mckeon notified of admit and Suboxone usage and instructions    Good afternoon,    The only suggestion I would have for this patient is to discuss pain management for him without the use of Suboxone for the brief perioperative time with Dr. Henson so that he is not left without pain medications. Patient should also know that if he is left on Suboxone, his post operative pain management may be suboptimal. Otherwise nothing further on this patient from my standpoint. Thank you.    Shirin Mckeon M.D.    Called patient with anesthesia's recommendation and again recommended he discuss with Dr. Henson his suboxone/ pain managament and also if he were to stay on " Suboxone his post operative pain management may be suboptimal.  Pt reported he will call and discuss with Dr. Henson

## 2023-01-26 ENCOUNTER — HOSPITAL ENCOUNTER (OUTPATIENT)
Dept: LAB | Facility: MEDICAL CENTER | Age: 68
End: 2023-01-26
Attending: ORTHOPAEDIC SURGERY
Payer: MEDICARE

## 2023-01-26 DIAGNOSIS — Z01.812 PRE-OPERATIVE LABORATORY EXAMINATION: ICD-10-CM

## 2023-01-26 LAB
ANION GAP SERPL CALC-SCNC: 12 MMOL/L (ref 7–16)
BUN SERPL-MCNC: 17 MG/DL (ref 8–22)
CALCIUM SERPL-MCNC: 9.9 MG/DL (ref 8.5–10.5)
CHLORIDE SERPL-SCNC: 103 MMOL/L (ref 96–112)
CO2 SERPL-SCNC: 25 MMOL/L (ref 20–33)
CREAT SERPL-MCNC: 0.62 MG/DL (ref 0.5–1.4)
GFR SERPLBLD CREATININE-BSD FMLA CKD-EPI: 106 ML/MIN/1.73 M 2
GLUCOSE SERPL-MCNC: 128 MG/DL (ref 65–99)
POTASSIUM SERPL-SCNC: 4.4 MMOL/L (ref 3.6–5.5)
SODIUM SERPL-SCNC: 140 MMOL/L (ref 135–145)

## 2023-01-26 PROCEDURE — 80048 BASIC METABOLIC PNL TOTAL CA: CPT

## 2023-01-26 PROCEDURE — 36415 COLL VENOUS BLD VENIPUNCTURE: CPT

## 2023-01-27 NOTE — OR NURSING
Spoke with pt and he states could not get ECG done at the [facility] lab in [place]. FAX orders to [facility] in [place] and verbal instructions given to pt to go there to have the ECG done, pt agrees to this plan and will call this afternoon to schedule or get it done as a walk in.

## 2023-02-01 ENCOUNTER — ANESTHESIA EVENT (OUTPATIENT)
Dept: SURGERY | Facility: MEDICAL CENTER | Age: 68
End: 2023-02-01
Payer: MEDICARE

## 2023-02-01 ENCOUNTER — APPOINTMENT (OUTPATIENT)
Dept: RADIOLOGY | Facility: MEDICAL CENTER | Age: 68
End: 2023-02-01
Attending: ORTHOPAEDIC SURGERY
Payer: MEDICARE

## 2023-02-01 ENCOUNTER — HOSPITAL ENCOUNTER (OUTPATIENT)
Facility: MEDICAL CENTER | Age: 68
End: 2023-02-01
Attending: ORTHOPAEDIC SURGERY | Admitting: ORTHOPAEDIC SURGERY
Payer: MEDICARE

## 2023-02-01 ENCOUNTER — ANESTHESIA (OUTPATIENT)
Dept: SURGERY | Facility: MEDICAL CENTER | Age: 68
End: 2023-02-01
Payer: MEDICARE

## 2023-02-01 VITALS
RESPIRATION RATE: 18 BRPM | HEART RATE: 66 BPM | WEIGHT: 191.36 LBS | HEIGHT: 72 IN | TEMPERATURE: 97.9 F | DIASTOLIC BLOOD PRESSURE: 77 MMHG | SYSTOLIC BLOOD PRESSURE: 119 MMHG | BODY MASS INDEX: 25.92 KG/M2 | OXYGEN SATURATION: 92 %

## 2023-02-01 DIAGNOSIS — M19.231 OTHER SECONDARY OSTEOARTHRITIS OF RIGHT WRIST: ICD-10-CM

## 2023-02-01 PROBLEM — M06.9: Status: ACTIVE | Noted: 2023-02-01

## 2023-02-01 PROBLEM — M24.321: Status: ACTIVE | Noted: 2023-02-01

## 2023-02-01 LAB — GLUCOSE BLD STRIP.AUTO-MCNC: 136 MG/DL (ref 65–99)

## 2023-02-01 PROCEDURE — C1776 JOINT DEVICE (IMPLANTABLE): HCPCS | Performed by: ORTHOPAEDIC SURGERY

## 2023-02-01 PROCEDURE — 700101 HCHG RX REV CODE 250: Performed by: ANESTHESIOLOGY

## 2023-02-01 PROCEDURE — 01740 ANES OPN/ARTHRS PX ELBW NOS: CPT | Performed by: ANESTHESIOLOGY

## 2023-02-01 PROCEDURE — 160029 HCHG SURGERY MINUTES - 1ST 30 MINS LEVEL 4: Performed by: ORTHOPAEDIC SURGERY

## 2023-02-01 PROCEDURE — 160025 RECOVERY II MINUTES (STATS): Performed by: ORTHOPAEDIC SURGERY

## 2023-02-01 PROCEDURE — 700105 HCHG RX REV CODE 258: Performed by: ORTHOPAEDIC SURGERY

## 2023-02-01 PROCEDURE — 82962 GLUCOSE BLOOD TEST: CPT

## 2023-02-01 PROCEDURE — 160041 HCHG SURGERY MINUTES - EA ADDL 1 MIN LEVEL 4: Performed by: ORTHOPAEDIC SURGERY

## 2023-02-01 PROCEDURE — 502000 HCHG MISC OR IMPLANTS RC 0278: Performed by: ORTHOPAEDIC SURGERY

## 2023-02-01 PROCEDURE — 160048 HCHG OR STATISTICAL LEVEL 1-5: Performed by: ORTHOPAEDIC SURGERY

## 2023-02-01 PROCEDURE — 160046 HCHG PACU - 1ST 60 MINS PHASE II: Performed by: ORTHOPAEDIC SURGERY

## 2023-02-01 PROCEDURE — 160009 HCHG ANES TIME/MIN: Performed by: ORTHOPAEDIC SURGERY

## 2023-02-01 PROCEDURE — 160002 HCHG RECOVERY MINUTES (STAT): Performed by: ORTHOPAEDIC SURGERY

## 2023-02-01 PROCEDURE — 700111 HCHG RX REV CODE 636 W/ 250 OVERRIDE (IP): Performed by: ANESTHESIOLOGY

## 2023-02-01 PROCEDURE — 160035 HCHG PACU - 1ST 60 MINS PHASE I: Performed by: ORTHOPAEDIC SURGERY

## 2023-02-01 PROCEDURE — 700101 HCHG RX REV CODE 250: Performed by: ORTHOPAEDIC SURGERY

## 2023-02-01 DEVICE — IMPLANTABLE DEVICE: Type: IMPLANTABLE DEVICE | Site: WRIST | Status: FUNCTIONAL

## 2023-02-01 RX ORDER — LABETALOL HYDROCHLORIDE 5 MG/ML
5 INJECTION, SOLUTION INTRAVENOUS
Status: DISCONTINUED | OUTPATIENT
Start: 2023-02-01 | End: 2023-02-01 | Stop reason: HOSPADM

## 2023-02-01 RX ORDER — LIDOCAINE HYDROCHLORIDE 20 MG/ML
INJECTION, SOLUTION EPIDURAL; INFILTRATION; INTRACAUDAL; PERINEURAL PRN
Status: DISCONTINUED | OUTPATIENT
Start: 2023-02-01 | End: 2023-02-01 | Stop reason: SURG

## 2023-02-01 RX ORDER — DEXAMETHASONE SODIUM PHOSPHATE 4 MG/ML
INJECTION, SOLUTION INTRA-ARTICULAR; INTRALESIONAL; INTRAMUSCULAR; INTRAVENOUS; SOFT TISSUE PRN
Status: DISCONTINUED | OUTPATIENT
Start: 2023-02-01 | End: 2023-02-01 | Stop reason: SURG

## 2023-02-01 RX ORDER — CEFAZOLIN SODIUM 1 G/3ML
INJECTION, POWDER, FOR SOLUTION INTRAMUSCULAR; INTRAVENOUS PRN
Status: DISCONTINUED | OUTPATIENT
Start: 2023-02-01 | End: 2023-02-01 | Stop reason: SURG

## 2023-02-01 RX ORDER — ONDANSETRON 2 MG/ML
4 INJECTION INTRAMUSCULAR; INTRAVENOUS
Status: DISCONTINUED | OUTPATIENT
Start: 2023-02-01 | End: 2023-02-01 | Stop reason: HOSPADM

## 2023-02-01 RX ORDER — OXYCODONE HCL 5 MG/5 ML
10 SOLUTION, ORAL ORAL
Status: DISCONTINUED | OUTPATIENT
Start: 2023-02-01 | End: 2023-02-01 | Stop reason: HOSPADM

## 2023-02-01 RX ORDER — ONDANSETRON 2 MG/ML
INJECTION INTRAMUSCULAR; INTRAVENOUS PRN
Status: DISCONTINUED | OUTPATIENT
Start: 2023-02-01 | End: 2023-02-01 | Stop reason: SURG

## 2023-02-01 RX ORDER — SODIUM CHLORIDE, SODIUM LACTATE, POTASSIUM CHLORIDE, CALCIUM CHLORIDE 600; 310; 30; 20 MG/100ML; MG/100ML; MG/100ML; MG/100ML
INJECTION, SOLUTION INTRAVENOUS CONTINUOUS
Status: DISCONTINUED | OUTPATIENT
Start: 2023-02-01 | End: 2023-02-01 | Stop reason: HOSPADM

## 2023-02-01 RX ORDER — HYDROMORPHONE HYDROCHLORIDE 1 MG/ML
0.4 INJECTION, SOLUTION INTRAMUSCULAR; INTRAVENOUS; SUBCUTANEOUS
Status: DISCONTINUED | OUTPATIENT
Start: 2023-02-01 | End: 2023-02-01 | Stop reason: HOSPADM

## 2023-02-01 RX ORDER — MEPERIDINE HYDROCHLORIDE 25 MG/ML
6.25 INJECTION INTRAMUSCULAR; INTRAVENOUS; SUBCUTANEOUS
Status: DISCONTINUED | OUTPATIENT
Start: 2023-02-01 | End: 2023-02-01 | Stop reason: HOSPADM

## 2023-02-01 RX ORDER — BUPIVACAINE HYDROCHLORIDE AND EPINEPHRINE 5; 5 MG/ML; UG/ML
INJECTION, SOLUTION EPIDURAL; INTRACAUDAL; PERINEURAL
Status: DISCONTINUED | OUTPATIENT
Start: 2023-02-01 | End: 2023-02-01 | Stop reason: HOSPADM

## 2023-02-01 RX ORDER — DIPHENHYDRAMINE HYDROCHLORIDE 50 MG/ML
12.5 INJECTION INTRAMUSCULAR; INTRAVENOUS
Status: DISCONTINUED | OUTPATIENT
Start: 2023-02-01 | End: 2023-02-01 | Stop reason: HOSPADM

## 2023-02-01 RX ORDER — HYDROMORPHONE HYDROCHLORIDE 1 MG/ML
0.1 INJECTION, SOLUTION INTRAMUSCULAR; INTRAVENOUS; SUBCUTANEOUS
Status: DISCONTINUED | OUTPATIENT
Start: 2023-02-01 | End: 2023-02-01 | Stop reason: HOSPADM

## 2023-02-01 RX ORDER — OXYCODONE HCL 5 MG/5 ML
5 SOLUTION, ORAL ORAL
Status: DISCONTINUED | OUTPATIENT
Start: 2023-02-01 | End: 2023-02-01 | Stop reason: HOSPADM

## 2023-02-01 RX ORDER — HYDROMORPHONE HYDROCHLORIDE 1 MG/ML
0.2 INJECTION, SOLUTION INTRAMUSCULAR; INTRAVENOUS; SUBCUTANEOUS
Status: DISCONTINUED | OUTPATIENT
Start: 2023-02-01 | End: 2023-02-01 | Stop reason: HOSPADM

## 2023-02-01 RX ORDER — KETOROLAC TROMETHAMINE 30 MG/ML
INJECTION, SOLUTION INTRAMUSCULAR; INTRAVENOUS PRN
Status: DISCONTINUED | OUTPATIENT
Start: 2023-02-01 | End: 2023-02-01 | Stop reason: SURG

## 2023-02-01 RX ORDER — HALOPERIDOL 5 MG/ML
1 INJECTION INTRAMUSCULAR
Status: DISCONTINUED | OUTPATIENT
Start: 2023-02-01 | End: 2023-02-01 | Stop reason: HOSPADM

## 2023-02-01 RX ADMIN — KETOROLAC TROMETHAMINE 30 MG: 30 INJECTION, SOLUTION INTRAMUSCULAR at 12:57

## 2023-02-01 RX ADMIN — CEFAZOLIN 2 G: 330 INJECTION, POWDER, FOR SOLUTION INTRAMUSCULAR; INTRAVENOUS at 11:58

## 2023-02-01 RX ADMIN — LIDOCAINE HYDROCHLORIDE 100 MG: 20 INJECTION, SOLUTION EPIDURAL; INFILTRATION; INTRACAUDAL at 11:58

## 2023-02-01 RX ADMIN — SODIUM CHLORIDE, POTASSIUM CHLORIDE, SODIUM LACTATE AND CALCIUM CHLORIDE: 600; 310; 30; 20 INJECTION, SOLUTION INTRAVENOUS at 11:49

## 2023-02-01 RX ADMIN — FENTANYL CITRATE 50 MCG: 50 INJECTION, SOLUTION INTRAMUSCULAR; INTRAVENOUS at 12:40

## 2023-02-01 RX ADMIN — FENTANYL CITRATE 50 MCG: 50 INJECTION, SOLUTION INTRAMUSCULAR; INTRAVENOUS at 12:22

## 2023-02-01 RX ADMIN — DEXAMETHASONE SODIUM PHOSPHATE 4 MG: 4 INJECTION, SOLUTION INTRA-ARTICULAR; INTRALESIONAL; INTRAMUSCULAR; INTRAVENOUS; SOFT TISSUE at 11:58

## 2023-02-01 RX ADMIN — SODIUM CHLORIDE, POTASSIUM CHLORIDE, SODIUM LACTATE AND CALCIUM CHLORIDE: 600; 310; 30; 20 INJECTION, SOLUTION INTRAVENOUS at 11:55

## 2023-02-01 RX ADMIN — EPHEDRINE SULFATE 10 MG: 50 INJECTION, SOLUTION INTRAVENOUS at 12:11

## 2023-02-01 RX ADMIN — PROPOFOL 100 MG: 10 INJECTION, EMULSION INTRAVENOUS at 11:58

## 2023-02-01 RX ADMIN — ONDANSETRON 4 MG: 2 INJECTION INTRAMUSCULAR; INTRAVENOUS at 11:58

## 2023-02-01 RX ADMIN — EPHEDRINE SULFATE 10 MG: 50 INJECTION, SOLUTION INTRAVENOUS at 12:07

## 2023-02-01 ASSESSMENT — PAIN DESCRIPTION - PAIN TYPE
TYPE: SURGICAL PAIN

## 2023-02-01 ASSESSMENT — PAIN SCALES - GENERAL: PAIN_LEVEL: 2

## 2023-02-01 ASSESSMENT — FIBROSIS 4 INDEX: FIB4 SCORE: 1.23

## 2023-02-01 NOTE — ANESTHESIA POSTPROCEDURE EVALUATION
Patient: Michael Caceres III    Procedure Summary     Date: 02/01/23 Room / Location:  OR 02 / SURGERY [facility]    Anesthesia Start: 1155 Anesthesia Stop: 1305    Procedure: RIGHT RADIAL HEAD ARTHROPLASTY (Right: Wrist) Diagnosis: (FRACTURE OF RADIAL HEAD- RIGHT)    Surgeons: Gordo Ballard M.D. Responsible Provider: Dipesh Javier M.D.    Anesthesia Type: general ASA Status: 3          Final Anesthesia Type: general  Last vitals  BP   Blood Pressure : 116/53    Temp   36.9 °C (98.4 °F)    Pulse   67   Resp   15    SpO2   96 %      Anesthesia Post Evaluation    Patient location during evaluation: PACU  Patient participation: complete - patient participated  Level of consciousness: awake and alert  Pain score: 2    Airway patency: patent  Anesthetic complications: no  Cardiovascular status: adequate and hemodynamically stable  Respiratory status: acceptable  Hydration status: acceptable    PONV: none          No notable events documented.     Nurse Pain Score: 2 (NPRS)

## 2023-02-01 NOTE — OR NURSING
1303: To PACU from OR via gurchan, sleeping, respirations spontaneous and non-labored via OPA. Stable on 6L O2 via mask. Icepack applied over c/d/i right arm surgical dressings. RUE CMS intact.     1315: OPA out. Pt awake, verbally responsive. States pain is tolerable 2/10. Denies nause. Right arm elevated on 2 pillows.    1330: Pt resting comfrotably. States pain remains tolerable. Denies nausea. Tolerating diet coke. Dressing remains c/d/I/ with ice pack in place.    1345: No change. No change in surgical site assessment. Verified 10lb weight bearing limit with . Meets criteria to transfer to Stage 2.

## 2023-02-01 NOTE — DISCHARGE INSTRUCTIONS
GENERAL ORTHO DISCHARGE INSTRUCTIONS:    ACTIVITY: Weight bearing of no more than 10lbs in right arm    ASSISTED DEVICES: You are being discharged with the following special equipment:  Not Applicable    WOUND CARE:  You have a surgical wound that was closed with staples or sutures. These need to be removed 10-14 days after surgery.  If you are NOT in a splint or cast, the operative dressing should be removed 2 days after surgery, and dry gauze dressing changes should be performed daily after that.  You may shower when the operative dressing is removed.    SPLINT/CAST CARE:  If present, you should keep your splint or cast clean, dry, and intact. For bathing/showering, wrap the splint in a double plastic bag with tape around the upper part of the extremity to keep it dry.  Be aware that some spotting of the dressing with blood can occur and is normal. You should elevate your operative extremity to minimize swelling in your fingers.     ICE: Apply ice packs to the affected area (15 minutes on the arm, 15 minutes off the arm) for the first week, as you feel necessary to help with the pain and swelling.    ELEVATION: Keep your extremity elevated as much as possible, above your heart, to help control pain and swelling for at least 2 weeks. If the sensation in your fingers of your operative extremity changes, or the fingers change colors, or should your pain become too difficult to control, you should immediately elevate your operative extremity.  If these symptoms do not resolve after 30 minutes to 1 hour, you should seek medical care immediately.    IMMOBILIZATION:  If you are wearing a sling or a brace, you may remove it when awake and seated, if desired.  You may move your fingers/hand as your splint allows. Please wear your sling or brace at all times otherwise, which includes sleeping.    What to Expect Post Anesthesia    Rest and take it easy for the first 24 hours.  A responsible adult is recommended to remain  with you during that time.  It is normal to feel sleepy.  We encourage you to not do anything that requires balance, judgment or coordination.    FOR 24 HOURS DO NOT:  Drive, operate machinery or run household appliances.  Drink beer or alcoholic beverages.  Make important decisions or sign legal documents.    To avoid nausea, slowly advance diet as tolerated, avoiding spicy or greasy foods for the first day.  Add more substantial food to your diet according to your provider's instructions. INCREASE FLUIDS AND FIBER TO AVOID CONSTIPATION.    MILD FLU-LIKE SYMPTOMS ARE NORMAL.  YOU MAY EXPERIENCE GENERALIZED MUSCLE ACHES, THROAT IRRITATION, HEADACHE AND/OR SOME NAUSEA.    If any questions arise, call your provider,  Office #[phone].  If your provider is not available, please feel free to call the Surgical Center at [phone].    MEDICATIONS: Resume taking daily medication.  Take prescribed pain medication with food.  If no medication is prescribed, you may take non-aspirin pain medication if needed.  PAIN MEDICATION CAN BE VERY CONSTIPATING.  Take a stool softener or laxative such as senokot, pericolace, or milk of magnesia if needed.    Last pain medication given at: none given in recovery

## 2023-02-01 NOTE — ANESTHESIA PROCEDURE NOTES
Airway    Date/Time: 2/1/2023 12:00 PM  Performed by: Dipesh Javier M.D.  Authorized by: Dipesh Javier M.D.     Location:  OR  Urgency:  Elective  Indications for Airway Management:  Anesthesia      Spontaneous Ventilation: absent    Sedation Level:  Deep  Preoxygenated: Yes    Patient Position:  Sniffing  Mask Difficulty Assessment:  1 - vent by mask  Final Airway Type:  Supraglottic airway  Final Supraglottic Airway:  Standard LMA    SGA Size:  4  Number of Attempts at Approach:  1

## 2023-02-01 NOTE — OP REPORT
Date of surgery: 2/1/2023    Preoperative diagnosis: Right radiocapitellar joint arthritis    Postoperative diagnosis: Same    Surgery performed: Right radial head arthroplasty    Surgeon: Gordo Ballard MD    Anesthesia: General    Complications: None    Estimated blood loss: 20 mL    Implants: Skeletal dynamics radial head arthroplasty, stem size 11, radial head size 26 mm    Tourniquet time: 45 minutes    Tourniquet pressure: 250 mmHg    Indication for procedure: Michael is a pleasant gentleman who has had persistent right radiocapitellar arthritis that has been recalcitrant to nonoperative treatments including rest, NSAIDs, active modifications and steroid injections.  For these reasons the decision was made to taken the operating today for the above-mentioned procedure.    Description of procedure: On the date of surgery.  Was seen in the preoperative area where informed consent was obtained with all risks and benefits of the procedure explained and all questions answered.  He wished to proceed with the surgery.  Proper site was marked.  He was subsequently taken the operating room and placed in the supine position with all bony promises well-padded.  General endotracheal anesthesia was used.  A tourniquet was placed on the right upper extremity was then prepped and draped in the usual sterile fashion.    A timeout was performed with all persons in attendance agreed on the proper patient, proper surgical site and proper surgery be performed.    An Esmarch was used to exsanguinate the right upper extremity and the tourniquet was insufflated to 250 mmHg.  A longitudinal incision made in line with the proximal radius for a standard Kocher approach to the proximal radius.  Sharp and blunt dissection was taken down through subcutaneous tissues.  Bleeding was well controlled remove electrocautery.  I then exploited the Kocher interval and was able to bluntly dissect down to the joint capsule.  This was then  incised longitudinally using a Osceola blade.  Care was taken to retract and protect the posterior interosseous nerve throughout the case.  I visualized the radial head and placed Hohmann retractors around this.  I then used a saw to perform a osteotomy at the neck of the radius.  The radial head was removed and measured for size.  It was larger than the size 26 which was the largest skeletal dynamics radial head.    I then began broaching for the stem.  This was found to be a size 11 that had good fit and fill.  The wound was then thoroughly irrigated copiously with normal saline.  I then placed a size 11 trial along with a size 26 radial head.  This had good fit and fill.  Trial implants were removed and the wound was again irrigated normal saline.    Then placed a size 11 stem with a size 26 mm radial head.  This was then locked into position using the positioning guide.  The elbow was then taken through full range of motion.  The elbow was found to be stable.  This had good fit and fill.  X-rays were obtained to verify positioning and alignment.  Final x-rays were obtained.  The wound was again thoroughly irrigated copious amounts normal saline.    Deep soft tissues were repaired using 0 Vicryl.  Subcutaneous tissue repaired using 0 Monocryl.  Skin was repaired using a Zipline wound closure device.  The wound was then dressed with Xeroform, 4 x 4, sterile cast padding, Tom wrap and a loosely approximated Coban dressing.  The tourniquet was deflated.  General endotracheal anesthesia was reversed and he was taken the PACU in good stable condition.    Disposition: He will be discharged home on a regular diet.  He will have a 10 pound lifting restriction the right upper extremity.  He should apply ice and of as much possible for the next several days to decrease swelling and pain.  He was prescribed narcotic pain medication and instructed not to drive or operate machinery while taking this medication.  He will  return to clinic in 10 to 14 days for repeat evaluation.  The bandages should remain clean, dry and intact until that time.

## 2023-02-01 NOTE — ANESTHESIA PREPROCEDURE EVALUATION
Case: [id number] Date/Time: 02/01/23 1245    Procedure: RIGHT RADIAL HEAD ARTHROPLASTY    Pre-op diagnosis: FRACTURE OF RADIAL HEAD- RIGHT    Location: [facility] OR 02 / SURGERY [facility]    Surgeons: Gordo Ballard M.D.        67yoM with HTN, GERD, DMII    +chronic low back pain  +BPH    +chronic opiate use for back pain    +hx of tobacco  CHF listed on EMR- pt does not recall having been diagnosed, never seen cardiology    NPO  NKDA  No AC    Relevant Problems   CARDIAC   (positive) Essential hypertension   (positive) Hypertension due to endocrine disorder      GI   (positive) Gastroesophageal reflux disease      ENDO   (positive) Type 2 diabetes mellitus with obesity (HCC)       Physical Exam    Airway   Mallampati: II       Cardiovascular - normal exam     Dental - normal exam           Pulmonary - normal exam     Abdominal - normal exam     Neurological - normal exam                 Anesthesia Plan    ASA 3       Plan - general       Airway plan will be LMA    (Chronicopiate use)      Induction: intravenous    Postoperative Plan: Postoperative administration of opioids is intended.    Pertinent diagnostic labs and testing reviewed    Informed Consent:    Anesthetic plan and risks discussed with patient.

## 2023-02-01 NOTE — OR NURSING
1351: To stage ll. Up to chair and dressed w/ CNA assist. Pt denies pain or nausea.    1411: Home care instructions reviewed w/ pt and wife. No questions, meets criteria for discharge.

## 2023-02-01 NOTE — ANESTHESIA TIME REPORT
Anesthesia Start and Stop Event Times     Date Time Event    2/1/2023 1140 Ready for Procedure     1155 Anesthesia Start     1305 Anesthesia Stop        Responsible Staff  02/01/23    Name Role Begin End    Dipesh Javier M.D. Anesth 1155 1305        Overtime Reason:  no overtime (within assigned shift)    Comments:

## 2023-02-01 NOTE — OR NURSING
1055 PT TO PRE OP TO ASSUME CARE.    1057 Patient allergies and NPO status verified, home medication reconciliation completed and belongings secured. Patient verbalizes understanding of pain scale, expected course of stay and plan of care. Surgical site verified with patient. IV access established. Sequentials placed on legs

## 2023-11-02 ENCOUNTER — HOSPITAL ENCOUNTER (OUTPATIENT)
Dept: LAB | Facility: MEDICAL CENTER | Age: 68
End: 2023-11-02
Attending: FAMILY MEDICINE
Payer: MEDICARE

## 2023-11-02 LAB
ALBUMIN SERPL BCP-MCNC: 4.1 G/DL (ref 3.2–4.9)
ALBUMIN/GLOB SERPL: 1.6 G/DL
ALP SERPL-CCNC: 63 U/L (ref 30–99)
ALT SERPL-CCNC: 26 U/L (ref 2–50)
ANION GAP SERPL CALC-SCNC: 10 MMOL/L (ref 7–16)
AST SERPL-CCNC: 26 U/L (ref 12–45)
BASOPHILS # BLD AUTO: 0.5 % (ref 0–1.8)
BASOPHILS # BLD: 0.05 K/UL (ref 0–0.12)
BILIRUB SERPL-MCNC: 0.5 MG/DL (ref 0.1–1.5)
BUN SERPL-MCNC: 15 MG/DL (ref 8–22)
CALCIUM ALBUM COR SERPL-MCNC: 9.4 MG/DL (ref 8.5–10.5)
CALCIUM SERPL-MCNC: 9.5 MG/DL (ref 8.5–10.5)
CHLORIDE SERPL-SCNC: 102 MMOL/L (ref 96–112)
CHOLEST SERPL-MCNC: 94 MG/DL (ref 100–199)
CO2 SERPL-SCNC: 26 MMOL/L (ref 20–33)
CREAT SERPL-MCNC: 0.64 MG/DL (ref 0.5–1.4)
EOSINOPHIL # BLD AUTO: 0.16 K/UL (ref 0–0.51)
EOSINOPHIL NFR BLD: 1.6 % (ref 0–6.9)
ERYTHROCYTE [DISTWIDTH] IN BLOOD BY AUTOMATED COUNT: 51.1 FL (ref 35.9–50)
EST. AVERAGE GLUCOSE BLD GHB EST-MCNC: 137 MG/DL
FASTING STATUS PATIENT QL REPORTED: NORMAL
GFR SERPLBLD CREATININE-BSD FMLA CKD-EPI: 104 ML/MIN/1.73 M 2
GLOBULIN SER CALC-MCNC: 2.5 G/DL (ref 1.9–3.5)
GLUCOSE SERPL-MCNC: 163 MG/DL (ref 65–99)
HBA1C MFR BLD: 6.4 % (ref 4–5.6)
HCT VFR BLD AUTO: 47 % (ref 42–52)
HDLC SERPL-MCNC: 30 MG/DL
HGB BLD-MCNC: 16 G/DL (ref 14–18)
IMM GRANULOCYTES # BLD AUTO: 0.03 K/UL (ref 0–0.11)
IMM GRANULOCYTES NFR BLD AUTO: 0.3 % (ref 0–0.9)
LDLC SERPL CALC-MCNC: 52 MG/DL
LYMPHOCYTES # BLD AUTO: 0.59 K/UL (ref 1–4.8)
LYMPHOCYTES NFR BLD: 5.8 % (ref 22–41)
MCH RBC QN AUTO: 31.3 PG (ref 27–33)
MCHC RBC AUTO-ENTMCNC: 34 G/DL (ref 32.3–36.5)
MCV RBC AUTO: 91.8 FL (ref 81.4–97.8)
MONOCYTES # BLD AUTO: 0.78 K/UL (ref 0–0.85)
MONOCYTES NFR BLD AUTO: 7.6 % (ref 0–13.4)
NEUTROPHILS # BLD AUTO: 8.61 K/UL (ref 1.82–7.42)
NEUTROPHILS NFR BLD: 84.2 % (ref 44–72)
NRBC # BLD AUTO: 0 K/UL
NRBC BLD-RTO: 0 /100 WBC (ref 0–0.2)
PLATELET # BLD AUTO: 225 K/UL (ref 164–446)
PMV BLD AUTO: 9.7 FL (ref 9–12.9)
POTASSIUM SERPL-SCNC: 4.1 MMOL/L (ref 3.6–5.5)
PROT SERPL-MCNC: 6.6 G/DL (ref 6–8.2)
RBC # BLD AUTO: 5.12 M/UL (ref 4.7–6.1)
SODIUM SERPL-SCNC: 138 MMOL/L (ref 135–145)
T4 FREE SERPL-MCNC: 1.11 NG/DL (ref 0.93–1.7)
TRIGL SERPL-MCNC: 62 MG/DL (ref 0–149)
TSH SERPL DL<=0.005 MIU/L-ACNC: 1.07 UIU/ML (ref 0.38–5.33)
VIT B12 SERPL-MCNC: 727 PG/ML (ref 211–911)
WBC # BLD AUTO: 10.2 K/UL (ref 4.8–10.8)

## 2023-11-02 PROCEDURE — 82607 VITAMIN B-12: CPT

## 2023-11-02 PROCEDURE — 85025 COMPLETE CBC W/AUTO DIFF WBC: CPT

## 2023-11-02 PROCEDURE — 84443 ASSAY THYROID STIM HORMONE: CPT

## 2023-11-02 PROCEDURE — 84270 ASSAY OF SEX HORMONE GLOBUL: CPT

## 2023-11-02 PROCEDURE — 84403 ASSAY OF TOTAL TESTOSTERONE: CPT

## 2023-11-02 PROCEDURE — 83036 HEMOGLOBIN GLYCOSYLATED A1C: CPT | Mod: GA

## 2023-11-02 PROCEDURE — 36415 COLL VENOUS BLD VENIPUNCTURE: CPT

## 2023-11-02 PROCEDURE — 84402 ASSAY OF FREE TESTOSTERONE: CPT

## 2023-11-02 PROCEDURE — 80061 LIPID PANEL: CPT

## 2023-11-02 PROCEDURE — 80053 COMPREHEN METABOLIC PANEL: CPT

## 2023-11-02 PROCEDURE — 84439 ASSAY OF FREE THYROXINE: CPT

## 2023-11-04 LAB
SHBG SERPL-SCNC: 43 NMOL/L (ref 19–76)
TESTOST FREE MFR SERPL: 1.6 % (ref 1.6–2.9)
TESTOST FREE SERPL-MCNC: 53 PG/ML (ref 47–244)
TESTOST SERPL-MCNC: 339 NG/DL (ref 300–720)

## 2024-05-31 ENCOUNTER — HOSPITAL ENCOUNTER (OUTPATIENT)
Dept: LAB | Facility: MEDICAL CENTER | Age: 69
End: 2024-05-31
Attending: FAMILY MEDICINE
Payer: MEDICARE

## 2024-05-31 LAB
ALBUMIN SERPL BCP-MCNC: 4.4 G/DL (ref 3.2–4.9)
ALBUMIN/GLOB SERPL: 1.8 G/DL
ALP SERPL-CCNC: 63 U/L (ref 30–99)
ALT SERPL-CCNC: 17 U/L (ref 2–50)
ANION GAP SERPL CALC-SCNC: 12 MMOL/L (ref 7–16)
AST SERPL-CCNC: 22 U/L (ref 12–45)
BILIRUB SERPL-MCNC: 0.4 MG/DL (ref 0.1–1.5)
BUN SERPL-MCNC: 14 MG/DL (ref 8–22)
CALCIUM ALBUM COR SERPL-MCNC: 9.6 MG/DL (ref 8.5–10.5)
CALCIUM SERPL-MCNC: 9.9 MG/DL (ref 8.5–10.5)
CHLORIDE SERPL-SCNC: 102 MMOL/L (ref 96–112)
CO2 SERPL-SCNC: 25 MMOL/L (ref 20–33)
CREAT SERPL-MCNC: 0.58 MG/DL (ref 0.5–1.4)
CREAT UR-MCNC: 56.42 MG/DL
EST. AVERAGE GLUCOSE BLD GHB EST-MCNC: 123 MG/DL
GFR SERPLBLD CREATININE-BSD FMLA CKD-EPI: 107 ML/MIN/1.73 M 2
GLOBULIN SER CALC-MCNC: 2.4 G/DL (ref 1.9–3.5)
GLUCOSE SERPL-MCNC: 176 MG/DL (ref 65–99)
HBA1C MFR BLD: 5.9 % (ref 4–5.6)
MICROALBUMIN UR-MCNC: 2.1 MG/DL
MICROALBUMIN/CREAT UR: 37 MG/G (ref 0–30)
POTASSIUM SERPL-SCNC: 4 MMOL/L (ref 3.6–5.5)
PROT SERPL-MCNC: 6.8 G/DL (ref 6–8.2)
PSA SERPL-MCNC: 0.18 NG/ML (ref 0–4)
SODIUM SERPL-SCNC: 139 MMOL/L (ref 135–145)

## 2024-05-31 PROCEDURE — 80053 COMPREHEN METABOLIC PANEL: CPT

## 2024-05-31 PROCEDURE — 84270 ASSAY OF SEX HORMONE GLOBUL: CPT

## 2024-05-31 PROCEDURE — 83036 HEMOGLOBIN GLYCOSYLATED A1C: CPT | Mod: GA

## 2024-05-31 PROCEDURE — 82570 ASSAY OF URINE CREATININE: CPT

## 2024-05-31 PROCEDURE — 84403 ASSAY OF TOTAL TESTOSTERONE: CPT

## 2024-05-31 PROCEDURE — 36415 COLL VENOUS BLD VENIPUNCTURE: CPT | Mod: GA

## 2024-05-31 PROCEDURE — 84153 ASSAY OF PSA TOTAL: CPT | Mod: GA

## 2024-05-31 PROCEDURE — 84402 ASSAY OF FREE TESTOSTERONE: CPT

## 2024-05-31 PROCEDURE — 82043 UR ALBUMIN QUANTITATIVE: CPT

## 2024-06-02 LAB
SHBG SERPL-SCNC: 70 NMOL/L (ref 19–76)
TESTOST FREE MFR SERPL: 1 % (ref 1.6–2.9)
TESTOST FREE SERPL-MCNC: 7 PG/ML (ref 47–244)
TESTOST SERPL-MCNC: 66 NG/DL (ref 300–720)

## 2024-08-14 ENCOUNTER — APPOINTMENT (OUTPATIENT)
Dept: RADIOLOGY | Facility: IMAGING CENTER | Age: 69
End: 2024-08-14
Attending: NURSE PRACTITIONER
Payer: MEDICARE

## 2024-08-14 ENCOUNTER — OFFICE VISIT (OUTPATIENT)
Dept: URGENT CARE | Facility: PHYSICIAN GROUP | Age: 69
End: 2024-08-14
Payer: MEDICARE

## 2024-08-14 VITALS
HEART RATE: 66 BPM | TEMPERATURE: 97.2 F | RESPIRATION RATE: 20 BRPM | WEIGHT: 196.5 LBS | HEIGHT: 73 IN | OXYGEN SATURATION: 94 % | BODY MASS INDEX: 26.04 KG/M2 | DIASTOLIC BLOOD PRESSURE: 64 MMHG | SYSTOLIC BLOOD PRESSURE: 122 MMHG

## 2024-08-14 DIAGNOSIS — M25.561 ACUTE PAIN OF RIGHT KNEE: ICD-10-CM
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
## 2024-08-14 PROCEDURE — 3074F SYST BP LT 130 MM HG: CPT | Performed by: NURSE PRACTITIONER

## 2024-08-14 PROCEDURE — 99213 OFFICE O/P EST LOW 20 MIN: CPT | Performed by: NURSE PRACTITIONER

## 2024-08-14 PROCEDURE — 3078F DIAST BP <80 MM HG: CPT | Performed by: NURSE PRACTITIONER

## 2024-08-14 PROCEDURE — 73564 X-RAY EXAM KNEE 4 OR MORE: CPT | Mod: TC,FY,RT | Performed by: NURSE PRACTITIONER

## 2024-08-14 RX ORDER — ACETAMINOPHEN 500 MG
1000 TABLET ORAL ONCE
Status: COMPLETED | OUTPATIENT
Start: 2024-08-14 | End: 2024-08-14

## 2024-08-14 RX ADMIN — Medication 1000 MG: at 14:08

## 2024-08-14 ASSESSMENT — FIBROSIS 4 INDEX: FIB4 SCORE: 1.59

## 2024-08-14 NOTE — PROGRESS NOTES
Subjective:     Michael Caceres III is a 69 y.o. male who presents for Fall (Pt tripped over a crack in the driveway landed on concrete a few hours ago, pt has pain in right knee  )      Fall    Pt presents for evaluation of a new problem. Eduard is a very pleasant 69-year-old male who presents to urgent care today with complaints of right-sided knee pain after suffering a fall on the concrete in his driveway.  He states that his foot got stuck in a crack causing him to land on both knees and catching himself with bilateral palms.  He did not hit his head or lose any consciousness.  He does have difficulty extending his right leg as well as with full flexion.    ROS    PMH:   Past Medical History:   Diagnosis Date    Anesthesia     pt reports woke up during surgery with foot surgery and a pain management procedure    Arthritis     Osteoarthritis    Back pain     Blood clotting disorder (HCC) 2007    PE- years ago    BPH (benign prostatic hypertrophy)     Congestive heart failure ([facility])     1/25/2023 pt denies having this diagnosis    Dental disorder     Upper full plate; partial lower denture    Diabetes ([facility])     NIIDM    Campo catheter in place     Hip pain     Hypertension     MRSA (methicillin resistant Staphylococcus aureus) colonization     abdominal wound- 2017 No open wounds    Neuropathy     OA (osteoarthritis)     Snoring     Neg sleep study    Urinary retention      ALLERGIES: No Known Allergies  SURGHX:   Past Surgical History:   Procedure Laterality Date    ELBOWPLASTY Right 2/1/2023    Procedure: RIGHT RADIAL HEAD ARTHROPLASTY;  Surgeon: Gordo Ballard M.D.;  Location: SURGERY [facility];  Service: Orthopedics    HIP REVISION TOTAL Right 04/09/2019    Procedure: REVISION, TOTAL ARTHROPLASTY, HIP-  AND OTHER INDICATED PROCEDURES;  Surgeon: Shawn Chaudhari M.D.;  Location: SURGERY [facility];  Service: Orthopedics    HIP ARTHROPLASTY TOTAL Right 11/14/2017    Procedure: HIP ARTHROPLASTY  "TOTAL;  Surgeon: Shawn Chaudhari M.D.;  Location: SURGERY [facility];  Service: Orthopedics    ORIF, FRACTURE, FEMUR Left 2016    Procedure: FEMUR OPEN REDUCTION INTERNAL FIXATION ;  Surgeon: Shawn Chaudhari M.D.;  Location: SURGERY [facility];  Service:     HERNIA REPAIR      abdominal with mesh, several surgeries    BOWEL RESECTION  2010    several times    HIP ARTHROPLASTY TOTAL Left 2007    HARDWARE REMOVAL SPINE  2003    FACIAL FRACTURE ORIF  1975    HERNIA REPAIR      x 8    LUMBAR FUSION ANTERIOR      LUMBAR LAMINECTOMY DISKECTOMY      multiple surgeries , ,  and      SOCHX:   Social History     Socioeconomic History    Marital status:    Tobacco Use    Smoking status: Former     Current packs/day: 0.00     Average packs/day: 1 pack/day for 30.0 years (30.0 ttl pk-yrs)     Types: Cigarettes     Start date: 10/8/1987     Quit date: 10/8/2017     Years since quittin.8    Smokeless tobacco: Never   Vaping Use    Vaping status: Never Used   Substance and Sexual Activity    Alcohol use: No     Comment: 2-3 times per year    Drug use: Yes     Types: Inhaled     Comment: marijuana     FH: No family history on file.      Objective:   /64   Pulse 66   Temp 36.2 °C (97.2 °F) (Temporal)   Resp 20   Ht 1.854 m (6' 1\")   Wt 89.1 kg (196 lb 8 oz)   SpO2 94%   BMI 25.93 kg/m²     Physical Exam  Vitals and nursing note reviewed.   Constitutional:       General: He is not in acute distress.     Appearance: Normal appearance. He is normal weight. He is not ill-appearing or toxic-appearing.   HENT:      Head: Normocephalic.      Right Ear: External ear normal.      Left Ear: External ear normal.      Nose: Nose normal.      Mouth/Throat:      Mouth: Mucous membranes are moist.   Eyes:      General:         Right eye: No discharge.         Left eye: No discharge.      Extraocular Movements: Extraocular movements intact.      Conjunctiva/sclera: Conjunctivae normal. "      Pupils: Pupils are equal, round, and reactive to light.   Pulmonary:      Effort: Pulmonary effort is normal.      Breath sounds: Normal breath sounds.   Abdominal:      General: Abdomen is flat.   Musculoskeletal:         General: Normal range of motion.      Cervical back: Normal range of motion and neck supple. No rigidity.      Comments: Superficial nonbleeding abrasions present to bilateral anterior knees.  Right knee is slightly swollen but no ecchymosis present.  There is pain with extension and flexion to medial, anterior and lateral portion of his right knee.   Lymphadenopathy:      Cervical: No cervical adenopathy.   Skin:     General: Skin is warm and dry.   Neurological:      General: No focal deficit present.      Mental Status: He is alert and oriented to person, place, and time. Mental status is at baseline.   Psychiatric:         Mood and Affect: Mood normal.         Behavior: Behavior normal.         Judgment: Judgment normal.     DX-KNEE COMPLETE 4+ RIGHT    Result Date: 8/14/2024 8/14/2024 1:37 PM HISTORY/REASON FOR EXAM:  Right knee pain TECHNIQUE/EXAM DESCRIPTION AND NUMBER OF VIEWS:  4 views of the RIGHT knee. COMPARISON: None FINDINGS: Bone density is osteopenic  There is no evidence of fracture or dislocation.  There is mild to moderate joint space narrowing and periarticular sclerosis and marginal spurring including the patellofemoral joint.  There is small joint effusion.     1.  No evidence of fracture or dislocation. 2.  Small joint effusion. 3.  Mild to moderate osteoarthritis.     Assessment/Plan:   Assessment    1. Acute pain of right knee  DX-KNEE COMPLETE 4+ RIGHT    Referral to Orthopedics    acetaminophen (Tylenol) tablet 1,000 mg    CANCELED: Referral to Orthopedics      2. Fall on concrete  Referral to Orthopedics    CANCELED: Referral to Orthopedics        X-ray results discussed with patient.  No evidence of fracture or dislocation today.  We did discuss possibility of  tendon versus ligament injury.  He was provided with hinged knee brace and wounds were cleansed with Hibiclens and normal saline and dressed with Polysporin nonadherent gauze and Coban.  Patient does note that knee feels better with knee brace in place.  He does have a cane for use.  Referral given to follow-up with [facility] orthopedics if knee pain is not improved or is worsening.  He is in agreement with plan of care.  AVS handout given and reviewed with patient. Pt educated on red flags and when to seek treatment back in ER or UC.

## 2024-12-06 ENCOUNTER — HOSPITAL ENCOUNTER (OUTPATIENT)
Dept: LAB | Facility: MEDICAL CENTER | Age: 69
End: 2024-12-06
Attending: FAMILY MEDICINE
Payer: MEDICARE

## 2024-12-06 LAB
ALBUMIN SERPL BCP-MCNC: 4.5 G/DL (ref 3.2–4.9)
ALBUMIN/GLOB SERPL: 2 G/DL
ALP SERPL-CCNC: 76 U/L (ref 30–99)
ALT SERPL-CCNC: 23 U/L (ref 2–50)
ANION GAP SERPL CALC-SCNC: 11 MMOL/L (ref 7–16)
AST SERPL-CCNC: 22 U/L (ref 12–45)
BASOPHILS # BLD AUTO: 0.5 % (ref 0–1.8)
BASOPHILS # BLD: 0.04 K/UL (ref 0–0.12)
BILIRUB SERPL-MCNC: 0.4 MG/DL (ref 0.1–1.5)
BUN SERPL-MCNC: 20 MG/DL (ref 8–22)
CALCIUM ALBUM COR SERPL-MCNC: 9.7 MG/DL (ref 8.5–10.5)
CALCIUM SERPL-MCNC: 10.1 MG/DL (ref 8.5–10.5)
CHLORIDE SERPL-SCNC: 100 MMOL/L (ref 96–112)
CHOLEST SERPL-MCNC: 112 MG/DL (ref 100–199)
CO2 SERPL-SCNC: 25 MMOL/L (ref 20–33)
CREAT SERPL-MCNC: 0.66 MG/DL (ref 0.5–1.4)
EOSINOPHIL # BLD AUTO: 0.14 K/UL (ref 0–0.51)
EOSINOPHIL NFR BLD: 1.9 % (ref 0–6.9)
ERYTHROCYTE [DISTWIDTH] IN BLOOD BY AUTOMATED COUNT: 45.4 FL (ref 35.9–50)
FASTING STATUS PATIENT QL REPORTED: NORMAL
GFR SERPLBLD CREATININE-BSD FMLA CKD-EPI: 103 ML/MIN/1.73 M 2
GLOBULIN SER CALC-MCNC: 2.2 G/DL (ref 1.9–3.5)
GLUCOSE SERPL-MCNC: 192 MG/DL (ref 65–99)
HCT VFR BLD AUTO: 43.3 % (ref 42–52)
HDLC SERPL-MCNC: 45 MG/DL
HGB BLD-MCNC: 14.8 G/DL (ref 14–18)
IMM GRANULOCYTES # BLD AUTO: 0.01 K/UL (ref 0–0.11)
IMM GRANULOCYTES NFR BLD AUTO: 0.1 % (ref 0–0.9)
LDLC SERPL CALC-MCNC: 52 MG/DL
LYMPHOCYTES # BLD AUTO: 1.4 K/UL (ref 1–4.8)
LYMPHOCYTES NFR BLD: 18.7 % (ref 22–41)
MCH RBC QN AUTO: 32 PG (ref 27–33)
MCHC RBC AUTO-ENTMCNC: 34.2 G/DL (ref 32.3–36.5)
MCV RBC AUTO: 93.7 FL (ref 81.4–97.8)
MONOCYTES # BLD AUTO: 0.45 K/UL (ref 0–0.85)
MONOCYTES NFR BLD AUTO: 6 % (ref 0–13.4)
NEUTROPHILS # BLD AUTO: 5.45 K/UL (ref 1.82–7.42)
NEUTROPHILS NFR BLD: 72.8 % (ref 44–72)
NRBC # BLD AUTO: 0 K/UL
NRBC BLD-RTO: 0 /100 WBC (ref 0–0.2)
PLATELET # BLD AUTO: 247 K/UL (ref 164–446)
PMV BLD AUTO: 9.8 FL (ref 9–12.9)
POTASSIUM SERPL-SCNC: 4.3 MMOL/L (ref 3.6–5.5)
PROT SERPL-MCNC: 6.7 G/DL (ref 6–8.2)
RBC # BLD AUTO: 4.62 M/UL (ref 4.7–6.1)
SODIUM SERPL-SCNC: 136 MMOL/L (ref 135–145)
TRIGL SERPL-MCNC: 74 MG/DL (ref 0–149)
TSH SERPL-ACNC: 1.73 UIU/ML (ref 0.35–5.5)
WBC # BLD AUTO: 7.5 K/UL (ref 4.8–10.8)

## 2024-12-06 PROCEDURE — 36415 COLL VENOUS BLD VENIPUNCTURE: CPT

## 2024-12-06 PROCEDURE — 80053 COMPREHEN METABOLIC PANEL: CPT

## 2024-12-06 PROCEDURE — 83036 HEMOGLOBIN GLYCOSYLATED A1C: CPT | Mod: GA

## 2024-12-06 PROCEDURE — 84443 ASSAY THYROID STIM HORMONE: CPT

## 2024-12-06 PROCEDURE — 85025 COMPLETE CBC W/AUTO DIFF WBC: CPT

## 2024-12-06 PROCEDURE — 80061 LIPID PANEL: CPT

## 2024-12-07 LAB
EST. AVERAGE GLUCOSE BLD GHB EST-MCNC: 140 MG/DL
HBA1C MFR BLD: 6.5 % (ref 4–5.6)

## 2025-05-07 ENCOUNTER — HOSPITAL ENCOUNTER (OUTPATIENT)
Dept: LAB | Facility: MEDICAL CENTER | Age: 70
End: 2025-05-07
Attending: FAMILY MEDICINE
Payer: MEDICARE

## 2025-05-07 LAB
ANION GAP SERPL CALC-SCNC: 7 MMOL/L (ref 7–16)
BUN SERPL-MCNC: 22 MG/DL (ref 8–22)
CALCIUM SERPL-MCNC: 9.5 MG/DL (ref 8.5–10.5)
CHLORIDE SERPL-SCNC: 103 MMOL/L (ref 96–112)
CO2 SERPL-SCNC: 26 MMOL/L (ref 20–33)
CREAT SERPL-MCNC: 0.69 MG/DL (ref 0.5–1.4)
EST. AVERAGE GLUCOSE BLD GHB EST-MCNC: 131 MG/DL
GFR SERPLBLD CREATININE-BSD FMLA CKD-EPI: 101 ML/MIN/1.73 M 2
GLUCOSE SERPL-MCNC: 169 MG/DL (ref 65–99)
HBA1C MFR BLD: 6.2 % (ref 4–5.6)
POTASSIUM SERPL-SCNC: 4.5 MMOL/L (ref 3.6–5.5)
SODIUM SERPL-SCNC: 136 MMOL/L (ref 135–145)
VIT B12 SERPL-MCNC: 744 PG/ML (ref 211–911)

## 2025-05-07 PROCEDURE — 82607 VITAMIN B-12: CPT

## 2025-05-07 PROCEDURE — 82570 ASSAY OF URINE CREATININE: CPT

## 2025-05-07 PROCEDURE — 82043 UR ALBUMIN QUANTITATIVE: CPT

## 2025-05-07 PROCEDURE — 80048 BASIC METABOLIC PNL TOTAL CA: CPT

## 2025-05-07 PROCEDURE — 36415 COLL VENOUS BLD VENIPUNCTURE: CPT

## 2025-05-07 PROCEDURE — 83036 HEMOGLOBIN GLYCOSYLATED A1C: CPT

## 2025-05-09 LAB
COLLECT DURATION TIME SPEC: ABNORMAL HR
CREAT 24H UR-MCNC: 30 MG/DL
MICROALBUMIN 24H UR-MCNC: 1.6 MG/DL
MICROALBUMIN/CREAT 24H UR: 53 MG/G (ref 0–30)
SPECIMEN VOL ?TM UR: ABNORMAL ML

## 2025-07-21 ENCOUNTER — HOSPITAL ENCOUNTER (OUTPATIENT)
Dept: LAB | Facility: MEDICAL CENTER | Age: 70
End: 2025-07-21
Attending: FAMILY MEDICINE
Payer: MEDICARE

## 2025-07-21 LAB
ALBUMIN SERPL BCP-MCNC: 4.3 G/DL (ref 3.2–4.9)
ALBUMIN/GLOB SERPL: 1.6 G/DL
ALP SERPL-CCNC: 78 U/L (ref 30–99)
ALT SERPL-CCNC: 22 U/L (ref 2–50)
ANION GAP SERPL CALC-SCNC: 11 MMOL/L (ref 7–16)
AST SERPL-CCNC: 26 U/L (ref 12–45)
BILIRUB SERPL-MCNC: 0.4 MG/DL (ref 0.1–1.5)
BUN SERPL-MCNC: 18 MG/DL (ref 8–22)
CALCIUM ALBUM COR SERPL-MCNC: 9.6 MG/DL (ref 8.5–10.5)
CALCIUM SERPL-MCNC: 9.8 MG/DL (ref 8.5–10.5)
CHLORIDE SERPL-SCNC: 104 MMOL/L (ref 96–112)
CO2 SERPL-SCNC: 23 MMOL/L (ref 20–33)
CREAT SERPL-MCNC: 0.75 MG/DL (ref 0.5–1.4)
GFR SERPLBLD CREATININE-BSD FMLA CKD-EPI: 98 ML/MIN/1.73 M 2
GLOBULIN SER CALC-MCNC: 2.7 G/DL (ref 1.9–3.5)
GLUCOSE SERPL-MCNC: 101 MG/DL (ref 65–99)
POTASSIUM SERPL-SCNC: 4.2 MMOL/L (ref 3.6–5.5)
PROT SERPL-MCNC: 7 G/DL (ref 6–8.2)
PSA SERPL DL<=0.01 NG/ML-MCNC: 0.2 NG/ML (ref 0–4)
SODIUM SERPL-SCNC: 138 MMOL/L (ref 135–145)

## 2025-07-21 PROCEDURE — 36415 COLL VENOUS BLD VENIPUNCTURE: CPT

## 2025-07-21 PROCEDURE — 80053 COMPREHEN METABOLIC PANEL: CPT

## 2025-07-21 PROCEDURE — 84153 ASSAY OF PSA TOTAL: CPT | Mod: GA

## (undated) DEVICE — PACK TOTAL HIP - (1/CA)

## (undated) DEVICE — SODIUM CHL IRRIGATION 0.9% 1000ML (12EA/CA)

## (undated) DEVICE — SUTURE 1 VICRYL PLUS CTX - 36 INCH (36/BX)

## (undated) DEVICE — PAD LAP STERILE 18 X 18 - (5/PK 40PK/CA)

## (undated) DEVICE — SUTURE 5 ETHIBOND V-37 (12PK/BX)

## (undated) DEVICE — SUCTION INSTRUMENT YANKAUER BULBOUS TIP W/O VENT (50EA/CA)

## (undated) DEVICE — WRAP CO-FLEX 4IN X 5YD STERIL - SELF-ADHERENT (18/CA)

## (undated) DEVICE — CANISTER SUCTION RIGID RED 1500CC (40EA/CA)

## (undated) DEVICE — TUBING CLEARLINK DUO-VENT - C-FLO (48EA/CA)

## (undated) DEVICE — DRESSING POST OP BORDER 4 X 10 (5EA/BX)

## (undated) DEVICE — BOVIE BLADE COATED &INSULATED - 25/PK

## (undated) DEVICE — Device

## (undated) DEVICE — GLOVE BIOGEL SZ 8 SURGICAL PF LTX - (50PR/BX 4BX/CA)

## (undated) DEVICE — BLADE SAGITTAL SAW DUAL CUT 75.0 X 25.0MM (1/EA)

## (undated) DEVICE — SET LEADWIRE 5 LEAD BEDSIDE DISPOSABLE ECG (1SET OF 5/EA)

## (undated) DEVICE — SUTURE 2-0 VICRYL PLUS CTX - 36 INCH (36/BX)

## (undated) DEVICE — SUTURE GENERAL

## (undated) DEVICE — MASK ANESTHESIA ADULT  - (100/CA)

## (undated) DEVICE — GLOVE BIOGEL PI INDICATOR SZ 8.0 SURGICAL PF LF -(50/BX 4BX/CA)

## (undated) DEVICE — TRAY CATHETER FOLEY URINE METER W/STATLOCK 350ML (10EA/CA)

## (undated) DEVICE — GLOVE BIOGEL PI ORTHO SZ 7.5 PF LF (40PR/BX)

## (undated) DEVICE — SUTURE 2-0 VICRYL PLUS CT-1 36 (36PK/BX)"

## (undated) DEVICE — HANDPIECE 10FT INTPLS SCT PLS IRRIGATION HAND CONTROL SET (6/PK)

## (undated) DEVICE — DRESSING 3X8 ADAPTIC GAUZE - NON-ADHERING (36/PK 6PK/BX)

## (undated) DEVICE — KIT ANESTHESIA W/CIRCUIT & 3/LT BAG W/FILTER (20EA/CA)

## (undated) DEVICE — SUTURE 0 VICRYL PLUS CT-1 - 36 INCH (36/BX)

## (undated) DEVICE — CHLORAPREP 26 ML APPLICATOR - ORANGE TINT(25/CA)

## (undated) DEVICE — TUBE E-T HI-LO CUFF 7.5MM (10EA/PK)

## (undated) DEVICE — DRAPE LARGE 3 QUARTER - (20/CA)

## (undated) DEVICE — SENSOR SPO2 NEO LNCS ADHESIVE (20/BX) SEE USER NOTES

## (undated) DEVICE — BLADE SURGICAL #15 - (50/BX 3BX/CA)

## (undated) DEVICE — GLOVE BIOGEL INDICATOR SZ 7SURGICAL PF LTX - (50/BX 4BX/CA)

## (undated) DEVICE — LACTATED RINGERS INJ 1000 ML - (14EA/CA 60CA/PF)

## (undated) DEVICE — CANISTER SUCTION 3000ML MECHANICAL FILTER AUTO SHUTOFF MEDI-VAC NONSTERILE LF DISP  (40EA/CA)

## (undated) DEVICE — GLOVE BIOGEL SZ 6.5 SURGICAL PF LTX (50PR/BX 4BX/CA)

## (undated) DEVICE — SET EXTENSION WITH 2 PORTS (48EA/CA) ***PART #2C8610 IS A SUBSTITUTE*****

## (undated) DEVICE — HEAD HOLDER JUNIOR/ADULT

## (undated) DEVICE — ELECTRODE DUAL RETURN W/ CORD - (50/PK)

## (undated) DEVICE — DRAPE SURG STERI-DRAPE 7X11OD - (40EA/CA)

## (undated) DEVICE — HUMID-VENT HEAT AND MOISTURE EXCHANGE- (50/BX)

## (undated) DEVICE — TIP INTPLS HFLO ML ORFC BTRY - (12/CS)  FOR SURGILAV

## (undated) DEVICE — TUBE CONNECTING SUCTION - CLEAR PLASTIC STERILE 72 IN (50EA/CA)

## (undated) DEVICE — GLOVE, LITE (PAIR)

## (undated) DEVICE — GLOVE BIOGEL INDICATOR SZ 7.5 SURGICAL PF LTX - (50PR/BX 4BX/CA)

## (undated) DEVICE — DRESSING AQUACEL AG ADVANTAGE 3.5 X 10" (10EA/BX)"

## (undated) DEVICE — STOCKINET TUBULAR 6IN STERILE - 6 X 48YDS (25/CA)

## (undated) DEVICE — LENS/HOOD FOR SPACESUIT - (32/PK) PEEL AWAY FACE

## (undated) DEVICE — KIT ROOM DECONTAMINATION

## (undated) DEVICE — GLOVE BIOGEL SZ 7.5 SURGICAL PF LTX - (50PR/BX 4BX/CA)

## (undated) DEVICE — ELECTRODE 850 FOAM ADHESIVE - HYDROGEL RADIOTRNSPRNT (50/PK)

## (undated) DEVICE — GLOVE BIOGEL INDICATOR SZ 8 SURGICAL PF LTX - (50/BX 4BX/CA)

## (undated) DEVICE — GLOVE BIOGEL SZ 7 SURGICAL PF LTX - (50PR/BX 4BX/CA)

## (undated) DEVICE — GOWN WARMING STANDARD FLEX - (30/CA)

## (undated) DEVICE — SUTURE 5 TI-CRON HOS-14 - (36/BX)

## (undated) DEVICE — BAG, SPONGE COUNT 50600

## (undated) DEVICE — ADHESIVE DERMABOND HVD MINI (12EA/BX)

## (undated) DEVICE — NEPTUNE 4 PORT MANIFOLD - (20/PK)

## (undated) DEVICE — SUTURE 0 VICRYL PLUS CTX - 36 INCH (36/BX)

## (undated) DEVICE — WATER IRRIGATION STERILE 1000ML (12EA/CA)

## (undated) DEVICE — TOWEL STOP TIMEOUT SAFETY FLAG (40EA/CA)

## (undated) DEVICE — BLANKET WARMING UPPER BODY - (10/CA)

## (undated) DEVICE — PAD PREP 24 X 48 CUFFED - (100/CA)

## (undated) DEVICE — SUTURE 3-0 MONOCRYL PLUS PS-1 - 27 INCH (36/BX)

## (undated) DEVICE — SUTURE2-0 20CM STRATAFIX SPIRAL PDS CT-1 (12EA/BX)

## (undated) DEVICE — PADDING CAST 4 IN STERILE - 4 X 4 YDS (24/CA)

## (undated) DEVICE — SLEEVE, VASO, THIGH, MED

## (undated) DEVICE — PROTECTOR ULNA NERVE - (36PR/CA)

## (undated) DEVICE — SODIUM CHL. IRRIGATION 0.9% 3000ML (4EA/CA 65CA/PF)

## (undated) DEVICE — DRAPE STRLE REG TOWEL 18X24 - (10/BX 4BX/CA)"

## (undated) DEVICE — PACK UPPER EXTREMITY SM OR - (3/CA)

## (undated) DEVICE — GLOVE BIOGEL INDICATOR SZ 6.5 SURGICAL PF LTX - (50PR/BX 4BX/CA)

## (undated) DEVICE — DEVICE SKIN CLOSURE SURGICAL ZIP 8 (10EA/PK)

## (undated) DEVICE — TUBE E-T HI-LO CUFF 7.0MM (10EA/PK)

## (undated) DEVICE — PACK LOWER EXTREMITY - (2/CA)